# Patient Record
Sex: MALE | Race: BLACK OR AFRICAN AMERICAN | NOT HISPANIC OR LATINO
[De-identification: names, ages, dates, MRNs, and addresses within clinical notes are randomized per-mention and may not be internally consistent; named-entity substitution may affect disease eponyms.]

---

## 2017-02-13 ENCOUNTER — APPOINTMENT (OUTPATIENT)
Dept: INFECTIOUS DISEASE | Facility: CLINIC | Age: 32
End: 2017-02-13

## 2017-02-13 VITALS
SYSTOLIC BLOOD PRESSURE: 156 MMHG | HEIGHT: 68 IN | TEMPERATURE: 98.6 F | OXYGEN SATURATION: 98 % | WEIGHT: 160 LBS | BODY MASS INDEX: 24.25 KG/M2 | HEART RATE: 85 BPM | DIASTOLIC BLOOD PRESSURE: 89 MMHG

## 2017-02-13 RX ORDER — CLONIDINE HYDROCHLORIDE 0.1 MG/1
0.1 TABLET ORAL
Qty: 30 | Refills: 0 | Status: ACTIVE | COMMUNITY
Start: 2017-01-18

## 2017-02-13 RX ORDER — AMLODIPINE BESYLATE 10 MG/1
10 TABLET ORAL
Qty: 90 | Refills: 0 | Status: ACTIVE | COMMUNITY
Start: 2017-01-20

## 2017-02-16 LAB
CD3 CELLS # BLD: 1029 /UL
CD3 CELLS NFR BLD: 82 %
CD3+CD4+ CELLS # BLD: 418 /UL
CD3+CD4+ CELLS NFR BLD: 33 %
CD3+CD4+ CELLS/CD3+CD8+ CLL SPEC: 0.7 RATIO
CD3+CD8+ CELLS # SPEC: 593 /UL
CD3+CD8+ CELLS NFR BLD: 47 %
HIV1 RNA # SERPL NAA+PROBE: NOT DETECTED COPIES/ML
VIRAL LOAD LOG: NOT DETECTED LG10COP/ML

## 2017-03-21 ENCOUNTER — RX RENEWAL (OUTPATIENT)
Age: 32
End: 2017-03-21

## 2017-04-07 ENCOUNTER — OTHER (OUTPATIENT)
Age: 32
End: 2017-04-07

## 2017-04-18 ENCOUNTER — RX RENEWAL (OUTPATIENT)
Age: 32
End: 2017-04-18

## 2017-08-21 ENCOUNTER — RX RENEWAL (OUTPATIENT)
Age: 32
End: 2017-08-21

## 2017-11-02 ENCOUNTER — RX RENEWAL (OUTPATIENT)
Age: 32
End: 2017-11-02

## 2017-11-13 ENCOUNTER — RX RENEWAL (OUTPATIENT)
Age: 32
End: 2017-11-13

## 2017-11-16 ENCOUNTER — RX RENEWAL (OUTPATIENT)
Age: 32
End: 2017-11-16

## 2017-12-06 ENCOUNTER — APPOINTMENT (OUTPATIENT)
Dept: INFECTIOUS DISEASE | Facility: CLINIC | Age: 32
End: 2017-12-06

## 2018-01-05 ENCOUNTER — RX RENEWAL (OUTPATIENT)
Age: 33
End: 2018-01-05

## 2018-02-28 ENCOUNTER — RX RENEWAL (OUTPATIENT)
Age: 33
End: 2018-02-28

## 2018-03-06 ENCOUNTER — RX RENEWAL (OUTPATIENT)
Age: 33
End: 2018-03-06

## 2018-04-04 ENCOUNTER — APPOINTMENT (OUTPATIENT)
Dept: INFECTIOUS DISEASE | Facility: CLINIC | Age: 33
End: 2018-04-04

## 2018-04-26 ENCOUNTER — APPOINTMENT (OUTPATIENT)
Dept: INFECTIOUS DISEASE | Facility: CLINIC | Age: 33
End: 2018-04-26
Payer: MEDICARE

## 2018-04-26 VITALS
OXYGEN SATURATION: 90 % | WEIGHT: 152 LBS | HEIGHT: 68 IN | SYSTOLIC BLOOD PRESSURE: 186 MMHG | TEMPERATURE: 98.6 F | DIASTOLIC BLOOD PRESSURE: 114 MMHG | HEART RATE: 87 BPM | BODY MASS INDEX: 23.04 KG/M2

## 2018-04-26 PROCEDURE — 90732 PPSV23 VACC 2 YRS+ SUBQ/IM: CPT

## 2018-04-26 PROCEDURE — 90715 TDAP VACCINE 7 YRS/> IM: CPT

## 2018-04-26 PROCEDURE — 90472 IMMUNIZATION ADMIN EACH ADD: CPT

## 2018-04-26 PROCEDURE — G0009: CPT

## 2018-04-26 PROCEDURE — 99214 OFFICE O/P EST MOD 30 MIN: CPT | Mod: 25

## 2018-05-15 ENCOUNTER — RX RENEWAL (OUTPATIENT)
Age: 33
End: 2018-05-15

## 2018-08-30 ENCOUNTER — APPOINTMENT (OUTPATIENT)
Dept: INFECTIOUS DISEASE | Facility: CLINIC | Age: 33
End: 2018-08-30

## 2018-12-10 ENCOUNTER — APPOINTMENT (OUTPATIENT)
Dept: INFECTIOUS DISEASE | Facility: CLINIC | Age: 33
End: 2018-12-10
Payer: MEDICARE

## 2018-12-10 VITALS
HEART RATE: 87 BPM | HEIGHT: 68 IN | TEMPERATURE: 98.8 F | SYSTOLIC BLOOD PRESSURE: 158 MMHG | DIASTOLIC BLOOD PRESSURE: 96 MMHG | BODY MASS INDEX: 21.82 KG/M2 | OXYGEN SATURATION: 100 % | WEIGHT: 144 LBS

## 2018-12-10 PROCEDURE — 99213 OFFICE O/P EST LOW 20 MIN: CPT

## 2018-12-13 LAB
CD3 CELLS # BLD: 245 /UL
CD3 CELLS NFR BLD: 67 %
CD3+CD4+ CELLS # BLD: 135 /UL
CD3+CD4+ CELLS NFR BLD: 37 %
CD3+CD4+ CELLS/CD3+CD8+ CLL SPEC: 1.33 RATIO
CD3+CD8+ CELLS # SPEC: 101 /UL
CD3+CD8+ CELLS NFR BLD: 28 %
HIV1 RNA # SERPL NAA+PROBE: NORMAL
HIV1 RNA # SERPL NAA+PROBE: NORMAL COPIES/ML
VIRAL LOAD INTERP: NORMAL
VIRAL LOAD LOG: NORMAL LG COP/ML

## 2018-12-17 ENCOUNTER — RX RENEWAL (OUTPATIENT)
Age: 33
End: 2018-12-17

## 2019-02-08 ENCOUNTER — RX RENEWAL (OUTPATIENT)
Age: 34
End: 2019-02-08

## 2019-04-10 ENCOUNTER — APPOINTMENT (OUTPATIENT)
Dept: INFECTIOUS DISEASE | Facility: CLINIC | Age: 34
End: 2019-04-10
Payer: MEDICARE

## 2019-04-24 ENCOUNTER — RX RENEWAL (OUTPATIENT)
Age: 34
End: 2019-04-24

## 2019-05-24 ENCOUNTER — RX RENEWAL (OUTPATIENT)
Age: 34
End: 2019-05-24

## 2019-07-29 ENCOUNTER — RX RENEWAL (OUTPATIENT)
Age: 34
End: 2019-07-29

## 2019-08-16 ENCOUNTER — LABORATORY RESULT (OUTPATIENT)
Age: 34
End: 2019-08-16

## 2019-08-16 ENCOUNTER — APPOINTMENT (OUTPATIENT)
Dept: INFECTIOUS DISEASE | Facility: CLINIC | Age: 34
End: 2019-08-16
Payer: MEDICARE

## 2019-08-16 VITALS
DIASTOLIC BLOOD PRESSURE: 104 MMHG | OXYGEN SATURATION: 100 % | TEMPERATURE: 97.3 F | SYSTOLIC BLOOD PRESSURE: 170 MMHG | HEART RATE: 74 BPM | WEIGHT: 151 LBS | BODY MASS INDEX: 22.88 KG/M2 | HEIGHT: 68 IN

## 2019-08-16 DIAGNOSIS — I15.1 HYPERTENSION SECONDARY TO OTHER RENAL DISORDERS: ICD-10-CM

## 2019-08-16 DIAGNOSIS — Z01.00 ENCOUNTER FOR EXAMINATION OF EYES AND VISION W/OUT ABNORMAL FINDINGS: ICD-10-CM

## 2019-08-16 DIAGNOSIS — N28.89 HYPERTENSION SECONDARY TO OTHER RENAL DISORDERS: ICD-10-CM

## 2019-08-16 DIAGNOSIS — Z01.20 ENCOUNTER FOR DENTAL EXAMINATION AND CLEANING W/OUT ABNORMAL FINDINGS: ICD-10-CM

## 2019-08-16 DIAGNOSIS — Z92.89 PERSONAL HISTORY OF OTHER MEDICAL TREATMENT: ICD-10-CM

## 2019-08-16 PROCEDURE — 99214 OFFICE O/P EST MOD 30 MIN: CPT

## 2019-08-16 RX ORDER — SPIRONOLACTONE 25 MG/1
25 TABLET ORAL
Refills: 0 | Status: ACTIVE | COMMUNITY
Start: 2019-08-16

## 2019-08-16 NOTE — PHYSICAL EXAM
[General Appearance - Alert] : alert [General Appearance - In No Acute Distress] : in no acute distress [General Appearance - Well-Appearing] : healthy appearing [Sclera] : the sclera and conjunctiva were normal [PERRL With Normal Accommodation] : pupils were equal in size, round, reactive to light [Extraocular Movements] : extraocular movements were intact [Outer Ear] : the ears and nose were normal in appearance [Oropharynx] : the oropharynx was normal with no thrush [Neck Appearance] : the appearance of the neck was normal [Neck Cervical Mass (___cm)] : no neck mass was observed [Jugular Venous Distention Increased] : there was no jugular-venous distention [Thyroid Diffuse Enlargement] : the thyroid was not enlarged [Auscultation Breath Sounds / Voice Sounds] : lungs were clear to auscultation bilaterally [Heart Rate And Rhythm] : heart rate was normal and rhythm regular [Heart Sounds] : normal S1 and S2 [Murmurs] : no murmurs [Heart Sounds Gallop] : no gallops [Heart Sounds Pericardial Friction Rub] : no pericardial rub [Edema] : there was no peripheral edema [FreeTextEntry1] : UE AV fistula [Bowel Sounds] : normal bowel sounds [Abdomen Soft] : soft [Abdomen Tenderness] : non-tender [Abdomen Mass (___ Cm)] : no abdominal mass palpated [Costovertebral Angle Tenderness] : no CVA tenderness [No Palpable Adenopathy] : no palpable adenopathy [Skin Color & Pigmentation] : normal skin color and pigmentation [] : no rash [Oriented To Time, Place, And Person] : oriented to person, place, and time [Affect] : the affect was normal

## 2019-08-16 NOTE — ASSESSMENT
[Treatment Adherence] : treatment adherence [FreeTextEntry1] : HIV well controlled\par HTN being addressed\par ESRD on home HD - (listed for renal transplant at [facility])\par \par I am concerned about potential vascular complications of his HTN- currently being managed by his nephrologist\par \par Labs today [Medical Care Issues] : medical care issues

## 2019-08-16 NOTE — CONSULT LETTER
[Dear  ___] : Dear  [unfilled], [Consult Letter:] : I had the pleasure of evaluating your patient, [unfilled]. [Please see my note below.] : Please see my note below. [Consult Closing:] : Thank you very much for allowing me to participate in the care of this patient.  If you have any questions, please do not hesitate to contact me. [Sincerely,] : Sincerely, [FreeTextEntry2] : Erica Aragon MD\par [address] [facility]; [address]\[place]  [address] [FreeTextEntry3] : Gonsalo Joaquin MD, FACP ARMIN AAHIVM\par Infectious Diseases\par [facility] [DrNatalia  ___] : Dr. GIRARD

## 2019-08-16 NOTE — HISTORY OF PRESENT ILLNESS
[FreeTextEntry1] : Staying positive.\par Feeling well.\par Notes BP increased - recently begun on spironlactone by his nephrologist.\par He does home HD 5 times weekly without difficulty. Works in , exercises to some extent.Not sexually active at present.\par \par He takes Efavirenz, Lamivudine and weekly Tenofovir (each saturday) without adverse effects. On \par Labs from this visit 12/10/18 HIV vIral Load UNDETECTABLE, <12 jl; CD4 = 135 - 37%. He was feeling ill at that time\par \par As previously noted, Mr Lemus was hospitalized at [facility] between July 17 and August 6, 2015. The patient previously had a history of hypertension. The patient presented to [facility] medical history and had a complicated course complicated including group A streptococcal sepsis acute renal failure requiring hemodialysis respiratory failure pericarditis not requiring pericardiocentesis and pericardial window placement. \par Diagnostic evaluation revealed the patient to be HIV positive. \par On July 19, 2015 his HIV viral load was 297,059 and the HIV genotype was PANSUSCEPTIBLE. On July 20, 2015 his CD4 count was 199-20 percent. [Sexually Active] : The patient is not sexually active

## 2019-08-19 LAB
ALBUMIN SERPL ELPH-MCNC: 4.6 G/DL
ALP BLD-CCNC: 55 U/L
ALT SERPL-CCNC: 7 U/L
ANION GAP SERPL CALC-SCNC: 17 MMOL/L
AST SERPL-CCNC: <5 U/L
BASOPHILS # BLD AUTO: 0.03 K/UL
BASOPHILS NFR BLD AUTO: 0.9 %
BILIRUB SERPL-MCNC: 0.2 MG/DL
BUN SERPL-MCNC: 103 MG/DL
CALCIUM SERPL-MCNC: 8.7 MG/DL
CD3 CELLS # BLD: 597 /UL
CD3 CELLS NFR BLD: 84 %
CD3+CD4+ CELLS # BLD: 324 /UL
CD3+CD4+ CELLS NFR BLD: 46 %
CD3+CD4+ CELLS/CD3+CD8+ CLL SPEC: 1.27 RATIO
CD3+CD8+ CELLS # SPEC: 256 /UL
CD3+CD8+ CELLS NFR BLD: 36 %
CHLORIDE SERPL-SCNC: 101 MMOL/L
CHOLEST SERPL-MCNC: 231 MG/DL
CHOLEST/HDLC SERPL: 5.1 RATIO
CO2 SERPL-SCNC: 21 MMOL/L
CREAT SERPL-MCNC: 11.47 MG/DL
EOSINOPHIL # BLD AUTO: 0.05 K/UL
EOSINOPHIL NFR BLD AUTO: 1.5 %
GLUCOSE SERPL-MCNC: 88 MG/DL
HBV SURFACE AB SER QL: REACTIVE
HCT VFR BLD CALC: 27.2 %
HCV AB SER QL: NONREACTIVE
HCV S/CO RATIO: 0.09 S/CO
HDLC SERPL-MCNC: 45 MG/DL
HGB BLD-MCNC: 9.4 G/DL
HIV1 RNA # SERPL NAA+PROBE: NORMAL
HIV1 RNA # SERPL NAA+PROBE: NORMAL COPIES/ML
IMM GRANULOCYTES NFR BLD AUTO: 0.3 %
LDLC SERPL CALC-MCNC: 154 MG/DL
LYMPHOCYTES # BLD AUTO: 0.81 K/UL
LYMPHOCYTES NFR BLD AUTO: 23.7 %
MAN DIFF?: NORMAL
MCHC RBC-ENTMCNC: 32.2 PG
MCHC RBC-ENTMCNC: 34.6 GM/DL
MCV RBC AUTO: 93.2 FL
MONOCYTES # BLD AUTO: 0.39 K/UL
MONOCYTES NFR BLD AUTO: 11.4 %
NEUTROPHILS # BLD AUTO: 2.13 K/UL
NEUTROPHILS NFR BLD AUTO: 62.2 %
PLATELET # BLD AUTO: 211 K/UL
POTASSIUM SERPL-SCNC: 5.5 MMOL/L
PROT SERPL-MCNC: 7.4 G/DL
RBC # BLD: 2.92 M/UL
RBC # FLD: 14 %
SODIUM SERPL-SCNC: 139 MMOL/L
TRIGL SERPL-MCNC: 160 MG/DL
VIRAL LOAD INTERP: NORMAL
VIRAL LOAD LOG: NORMAL LG COP/ML
WBC # FLD AUTO: 3.42 K/UL

## 2019-08-20 ENCOUNTER — RX RENEWAL (OUTPATIENT)
Age: 34
End: 2019-08-20

## 2019-08-21 LAB — T PALLIDUM AB SER QL IA: POSITIVE

## 2019-08-22 LAB
M TB IFN-G BLD-IMP: NEGATIVE
QUANTIFERON TB PLUS MITOGEN MINUS NIL: 4.31 IU/ML
QUANTIFERON TB PLUS NIL: 0.01 IU/ML
QUANTIFERON TB PLUS TB1 MINUS NIL: 0 IU/ML
QUANTIFERON TB PLUS TB2 MINUS NIL: 0.01 IU/ML

## 2019-08-29 ENCOUNTER — CLINICAL ADVICE (OUTPATIENT)
Age: 34
End: 2019-08-29

## 2019-09-05 ENCOUNTER — APPOINTMENT (OUTPATIENT)
Dept: INFECTIOUS DISEASE | Facility: CLINIC | Age: 34
End: 2019-09-05

## 2019-11-15 ENCOUNTER — CLINICAL ADVICE (OUTPATIENT)
Age: 34
End: 2019-11-15

## 2019-12-04 ENCOUNTER — RX RENEWAL (OUTPATIENT)
Age: 34
End: 2019-12-04

## 2019-12-10 ENCOUNTER — APPOINTMENT (OUTPATIENT)
Dept: INFECTIOUS DISEASE | Facility: CLINIC | Age: 34
End: 2019-12-10

## 2020-01-01 ENCOUNTER — APPOINTMENT (OUTPATIENT)
Dept: INFECTIOUS DISEASE | Facility: CLINIC | Age: 35
End: 2020-01-01
Payer: MEDICARE

## 2020-01-01 ENCOUNTER — TRANSCRIPTION ENCOUNTER (OUTPATIENT)
Age: 35
End: 2020-01-01

## 2020-01-01 ENCOUNTER — RX RENEWAL (OUTPATIENT)
Age: 35
End: 2020-01-01

## 2020-01-01 ENCOUNTER — NON-APPOINTMENT (OUTPATIENT)
Age: 35
End: 2020-01-01

## 2020-01-01 ENCOUNTER — INPATIENT (INPATIENT)
Facility: HOSPITAL | Age: 35
LOS: 4 days | Discharge: HOME | End: 2020-03-02
Attending: INTERNAL MEDICINE | Admitting: INTERNAL MEDICINE
Payer: MEDICARE

## 2020-01-01 ENCOUNTER — OUTPATIENT (OUTPATIENT)
Dept: OUTPATIENT SERVICES | Facility: HOSPITAL | Age: 35
LOS: 1 days | End: 2020-01-01
Payer: MEDICARE

## 2020-01-01 ENCOUNTER — APPOINTMENT (OUTPATIENT)
Dept: INFECTIOUS DISEASE | Facility: CLINIC | Age: 35
End: 2020-01-01

## 2020-01-01 ENCOUNTER — MED ADMIN CHARGE (OUTPATIENT)
Age: 35
End: 2020-01-01

## 2020-01-01 VITALS
HEART RATE: 67 BPM | BODY MASS INDEX: 21.98 KG/M2 | HEIGHT: 68 IN | OXYGEN SATURATION: 100 % | DIASTOLIC BLOOD PRESSURE: 98 MMHG | SYSTOLIC BLOOD PRESSURE: 158 MMHG | WEIGHT: 145 LBS | TEMPERATURE: 97 F | RESPIRATION RATE: 18 BRPM

## 2020-01-01 VITALS
OXYGEN SATURATION: 95 % | HEART RATE: 96 BPM | TEMPERATURE: 100 F | SYSTOLIC BLOOD PRESSURE: 184 MMHG | DIASTOLIC BLOOD PRESSURE: 123 MMHG | RESPIRATION RATE: 18 BRPM

## 2020-01-01 VITALS
DIASTOLIC BLOOD PRESSURE: 84 MMHG | OXYGEN SATURATION: 99 % | TEMPERATURE: 98 F | SYSTOLIC BLOOD PRESSURE: 124 MMHG | HEART RATE: 80 BPM | RESPIRATION RATE: 18 BRPM | WEIGHT: 128.53 LBS

## 2020-01-01 DIAGNOSIS — N18.6 END STAGE RENAL DISEASE: ICD-10-CM

## 2020-01-01 DIAGNOSIS — I10 ESSENTIAL (PRIMARY) HYPERTENSION: ICD-10-CM

## 2020-01-01 DIAGNOSIS — Z29.9 ENCOUNTER FOR PROPHYLACTIC MEASURES, UNSPECIFIED: ICD-10-CM

## 2020-01-01 DIAGNOSIS — A53.9 SYPHILIS, UNSPECIFIED: ICD-10-CM

## 2020-01-01 DIAGNOSIS — D64.9 ANEMIA, UNSPECIFIED: ICD-10-CM

## 2020-01-01 DIAGNOSIS — R79.89 OTHER SPECIFIED ABNORMAL FINDINGS OF BLOOD CHEMISTRY: ICD-10-CM

## 2020-01-01 DIAGNOSIS — J18.9 PNEUMONIA, UNSPECIFIED ORGANISM: ICD-10-CM

## 2020-01-01 DIAGNOSIS — Z02.9 ENCOUNTER FOR ADMINISTRATIVE EXAMINATIONS, UNSPECIFIED: ICD-10-CM

## 2020-01-01 DIAGNOSIS — Z71.89 OTHER SPECIFIED COUNSELING: ICD-10-CM

## 2020-01-01 DIAGNOSIS — E87.5 HYPERKALEMIA: ICD-10-CM

## 2020-01-01 DIAGNOSIS — Z21 ASYMPTOMATIC HUMAN IMMUNODEFICIENCY VIRUS [HIV] INFECTION STATUS: ICD-10-CM

## 2020-01-01 DIAGNOSIS — B20 HUMAN IMMUNODEFICIENCY VIRUS [HIV] DISEASE: ICD-10-CM

## 2020-01-01 LAB
ALBUMIN SERPL ELPH-MCNC: 4.5 G/DL — SIGNIFICANT CHANGE UP (ref 3.3–5)
ALBUMIN SERPL ELPH-MCNC: 5.2 G/DL — HIGH (ref 3.3–5)
ALP SERPL-CCNC: 55 U/L — SIGNIFICANT CHANGE UP (ref 40–120)
ALP SERPL-CCNC: 62 U/L — SIGNIFICANT CHANGE UP (ref 40–120)
ALT FLD-CCNC: 19 U/L — SIGNIFICANT CHANGE UP (ref 4–41)
ALT FLD-CCNC: 22 U/L — SIGNIFICANT CHANGE UP (ref 4–41)
ANION GAP SERPL CALC-SCNC: 18 MMO/L — HIGH (ref 7–14)
ANION GAP SERPL CALC-SCNC: 21 MMO/L — HIGH (ref 7–14)
ANION GAP SERPL CALC-SCNC: 22 MMO/L — HIGH (ref 7–14)
ANION GAP SERPL CALC-SCNC: 28 MMO/L — HIGH (ref 7–14)
ANION GAP SERPL CALC-SCNC: 29 MMO/L — HIGH (ref 7–14)
ANION GAP SERPL CALC-SCNC: 29 MMO/L — HIGH (ref 7–14)
ANISOCYTOSIS BLD QL: SLIGHT — SIGNIFICANT CHANGE UP
APPEARANCE UR: CLEAR — SIGNIFICANT CHANGE UP
APTT BLD: 39.2 SEC — HIGH (ref 27.5–36.3)
AST SERPL-CCNC: 11 U/L — SIGNIFICANT CHANGE UP (ref 4–40)
AST SERPL-CCNC: 19 U/L — SIGNIFICANT CHANGE UP (ref 4–40)
B PERT DNA SPEC QL NAA+PROBE: NOT DETECTED — SIGNIFICANT CHANGE UP
BACTERIA # UR AUTO: NEGATIVE — SIGNIFICANT CHANGE UP
BACTERIA BLD CULT: SIGNIFICANT CHANGE UP
BACTERIA BLD CULT: SIGNIFICANT CHANGE UP
BACTERIA NPH CULT: SIGNIFICANT CHANGE UP
BACTERIA UR CULT: SIGNIFICANT CHANGE UP
BASE EXCESS BLDV CALC-SCNC: -6.2 MMOL/L — SIGNIFICANT CHANGE UP
BASE EXCESS BLDV CALC-SCNC: -7.5 MMOL/L — SIGNIFICANT CHANGE UP
BASOPHILS # BLD AUTO: 0.01 K/UL — SIGNIFICANT CHANGE UP (ref 0–0.2)
BASOPHILS # BLD AUTO: 0.01 K/UL — SIGNIFICANT CHANGE UP (ref 0–0.2)
BASOPHILS # BLD AUTO: 0.03 K/UL — SIGNIFICANT CHANGE UP (ref 0–0.2)
BASOPHILS NFR BLD AUTO: 0.3 % — SIGNIFICANT CHANGE UP (ref 0–2)
BASOPHILS NFR BLD AUTO: 0.3 % — SIGNIFICANT CHANGE UP (ref 0–2)
BASOPHILS NFR BLD AUTO: 0.5 % — SIGNIFICANT CHANGE UP (ref 0–2)
BASOPHILS NFR BLD AUTO: 1 % — SIGNIFICANT CHANGE UP (ref 0–2)
BASOPHILS NFR BLD AUTO: 1 % — SIGNIFICANT CHANGE UP (ref 0–2)
BASOPHILS NFR SPEC: 0 % — SIGNIFICANT CHANGE UP (ref 0–2)
BILIRUB SERPL-MCNC: 0.2 MG/DL — SIGNIFICANT CHANGE UP (ref 0.2–1.2)
BILIRUB SERPL-MCNC: 0.3 MG/DL — SIGNIFICANT CHANGE UP (ref 0.2–1.2)
BILIRUB UR-MCNC: NEGATIVE — SIGNIFICANT CHANGE UP
BLASTS # FLD: 0 % — SIGNIFICANT CHANGE UP (ref 0–0)
BLD GP AB SCN SERPL QL: NEGATIVE — SIGNIFICANT CHANGE UP
BLD GP AB SCN SERPL QL: NEGATIVE — SIGNIFICANT CHANGE UP
BLOOD GAS VENOUS - CREATININE: 12.7 MG/DL — HIGH (ref 0.5–1.3)
BLOOD GAS VENOUS - CREATININE: 13.6 MG/DL — HIGH (ref 0.5–1.3)
BLOOD GAS VENOUS - FIO2: 21 — SIGNIFICANT CHANGE UP
BLOOD UR QL VISUAL: NEGATIVE — SIGNIFICANT CHANGE UP
BUN SERPL-MCNC: 102 MG/DL — HIGH (ref 7–23)
BUN SERPL-MCNC: 113 MG/DL — HIGH (ref 7–23)
BUN SERPL-MCNC: 118 MG/DL — HIGH (ref 7–23)
BUN SERPL-MCNC: 32 MG/DL — HIGH (ref 7–23)
BUN SERPL-MCNC: 56 MG/DL — HIGH (ref 7–23)
BUN SERPL-MCNC: 59 MG/DL — HIGH (ref 7–23)
BUN SERPL-MCNC: 75 MG/DL — HIGH (ref 7–23)
BUN SERPL-MCNC: 75 MG/DL — HIGH (ref 7–23)
C PNEUM DNA SPEC QL NAA+PROBE: NOT DETECTED — SIGNIFICANT CHANGE UP
CALCIUM SERPL-MCNC: 8.6 MG/DL — SIGNIFICANT CHANGE UP (ref 8.4–10.5)
CALCIUM SERPL-MCNC: 8.6 MG/DL — SIGNIFICANT CHANGE UP (ref 8.4–10.5)
CALCIUM SERPL-MCNC: 9 MG/DL — SIGNIFICANT CHANGE UP (ref 8.4–10.5)
CALCIUM SERPL-MCNC: 9 MG/DL — SIGNIFICANT CHANGE UP (ref 8.4–10.5)
CALCIUM SERPL-MCNC: 9.3 MG/DL — SIGNIFICANT CHANGE UP (ref 8.4–10.5)
CALCIUM SERPL-MCNC: 9.4 MG/DL — SIGNIFICANT CHANGE UP (ref 8.4–10.5)
CALCIUM SERPL-MCNC: 9.5 MG/DL — SIGNIFICANT CHANGE UP (ref 8.4–10.5)
CALCIUM SERPL-MCNC: 9.6 MG/DL — SIGNIFICANT CHANGE UP (ref 8.4–10.5)
CD3 CELLS # BLD: 779 /UL
CD3 CELLS NFR BLD: 76 %
CD3+CD4+ CELLS # BLD: 411 /UL
CD3+CD4+ CELLS NFR BLD: 40 %
CD3+CD4+ CELLS/CD3+CD8+ CLL SPEC: 1.19 RATIO
CD3+CD8+ CELLS # SPEC: 346 /UL
CD3+CD8+ CELLS NFR BLD: 34 %
CHLORIDE BLDV-SCNC: 96 MMOL/L — SIGNIFICANT CHANGE UP (ref 96–108)
CHLORIDE BLDV-SCNC: 98 MMOL/L — SIGNIFICANT CHANGE UP (ref 96–108)
CHLORIDE SERPL-SCNC: 88 MMOL/L — LOW (ref 98–107)
CHLORIDE SERPL-SCNC: 89 MMOL/L — LOW (ref 98–107)
CHLORIDE SERPL-SCNC: 90 MMOL/L — LOW (ref 98–107)
CHLORIDE SERPL-SCNC: 91 MMOL/L — LOW (ref 98–107)
CHLORIDE SERPL-SCNC: 92 MMOL/L — LOW (ref 98–107)
CHOLEST SERPL-MCNC: 233 MG/DL — HIGH (ref 120–199)
CO2 SERPL-SCNC: 15 MMOL/L — LOW (ref 22–31)
CO2 SERPL-SCNC: 16 MMOL/L — LOW (ref 22–31)
CO2 SERPL-SCNC: 17 MMOL/L — LOW (ref 22–31)
CO2 SERPL-SCNC: 20 MMOL/L — LOW (ref 22–31)
CO2 SERPL-SCNC: 22 MMOL/L — SIGNIFICANT CHANGE UP (ref 22–31)
CO2 SERPL-SCNC: 22 MMOL/L — SIGNIFICANT CHANGE UP (ref 22–31)
CO2 SERPL-SCNC: 23 MMOL/L — SIGNIFICANT CHANGE UP (ref 22–31)
CO2 SERPL-SCNC: 24 MMOL/L — SIGNIFICANT CHANGE UP (ref 22–31)
COLOR SPEC: SIGNIFICANT CHANGE UP
CREAT SERPL-MCNC: 11.76 MG/DL — HIGH (ref 0.5–1.3)
CREAT SERPL-MCNC: 12.67 MG/DL — HIGH (ref 0.5–1.3)
CREAT SERPL-MCNC: 12.75 MG/DL — HIGH (ref 0.5–1.3)
CREAT SERPL-MCNC: 5.77 MG/DL — HIGH (ref 0.5–1.3)
CREAT SERPL-MCNC: 8.32 MG/DL — HIGH (ref 0.5–1.3)
CREAT SERPL-MCNC: 9.09 MG/DL — HIGH (ref 0.5–1.3)
CREAT SERPL-MCNC: 9.09 MG/DL — HIGH (ref 0.5–1.3)
CREAT SERPL-MCNC: 9.5 MG/DL — HIGH (ref 0.5–1.3)
EOSINOPHIL # BLD AUTO: 0 K/UL — SIGNIFICANT CHANGE UP (ref 0–0.5)
EOSINOPHIL # BLD AUTO: 0 K/UL — SIGNIFICANT CHANGE UP (ref 0–0.5)
EOSINOPHIL # BLD AUTO: 0.01 K/UL — SIGNIFICANT CHANGE UP (ref 0–0.5)
EOSINOPHIL # BLD AUTO: 0.03 K/UL — SIGNIFICANT CHANGE UP (ref 0–0.5)
EOSINOPHIL # BLD AUTO: 0.04 K/UL — SIGNIFICANT CHANGE UP (ref 0–0.5)
EOSINOPHIL NFR BLD AUTO: 0 % — SIGNIFICANT CHANGE UP (ref 0–6)
EOSINOPHIL NFR BLD AUTO: 0 % — SIGNIFICANT CHANGE UP (ref 0–6)
EOSINOPHIL NFR BLD AUTO: 0.3 % — SIGNIFICANT CHANGE UP (ref 0–6)
EOSINOPHIL NFR BLD AUTO: 1 % — SIGNIFICANT CHANGE UP (ref 0–6)
EOSINOPHIL NFR BLD AUTO: 1.4 % — SIGNIFICANT CHANGE UP (ref 0–6)
EOSINOPHIL NFR FLD: 0.9 % — SIGNIFICANT CHANGE UP (ref 0–6)
FERRITIN SERPL-MCNC: 1567 NG/ML — HIGH (ref 30–400)
FERRITIN SERPL-MCNC: 2600 NG/ML — HIGH (ref 30–400)
FLUAV H1 2009 PAND RNA SPEC QL NAA+PROBE: NOT DETECTED — SIGNIFICANT CHANGE UP
FLUAV H1 RNA SPEC QL NAA+PROBE: NOT DETECTED — SIGNIFICANT CHANGE UP
FLUAV H3 RNA SPEC QL NAA+PROBE: NOT DETECTED — SIGNIFICANT CHANGE UP
FLUAV SUBTYP SPEC NAA+PROBE: NOT DETECTED — SIGNIFICANT CHANGE UP
FLUBV RNA SPEC QL NAA+PROBE: DETECTED — HIGH
FOLATE SERPL-MCNC: > 20 NG/ML — HIGH (ref 4.7–20)
GAS PNL BLDV: 137 MMOL/L — SIGNIFICANT CHANGE UP (ref 136–146)
GAS PNL BLDV: 138 MMOL/L — SIGNIFICANT CHANGE UP (ref 136–146)
GIANT PLATELETS BLD QL SMEAR: PRESENT — SIGNIFICANT CHANGE UP
GLUCOSE BLDV-MCNC: 93 MG/DL — SIGNIFICANT CHANGE UP (ref 70–99)
GLUCOSE BLDV-MCNC: 98 MG/DL — SIGNIFICANT CHANGE UP (ref 70–99)
GLUCOSE SERPL-MCNC: 100 MG/DL — HIGH (ref 70–99)
GLUCOSE SERPL-MCNC: 84 MG/DL — SIGNIFICANT CHANGE UP (ref 70–99)
GLUCOSE SERPL-MCNC: 86 MG/DL — SIGNIFICANT CHANGE UP (ref 70–99)
GLUCOSE SERPL-MCNC: 86 MG/DL — SIGNIFICANT CHANGE UP (ref 70–99)
GLUCOSE SERPL-MCNC: 90 MG/DL — SIGNIFICANT CHANGE UP (ref 70–99)
GLUCOSE SERPL-MCNC: 90 MG/DL — SIGNIFICANT CHANGE UP (ref 70–99)
GLUCOSE SERPL-MCNC: 93 MG/DL — SIGNIFICANT CHANGE UP (ref 70–99)
GLUCOSE SERPL-MCNC: 96 MG/DL — SIGNIFICANT CHANGE UP (ref 70–99)
GLUCOSE UR-MCNC: NEGATIVE — SIGNIFICANT CHANGE UP
HADV DNA SPEC QL NAA+PROBE: NOT DETECTED — SIGNIFICANT CHANGE UP
HBA1C BLD-MCNC: 4.8 % — SIGNIFICANT CHANGE UP (ref 4–5.6)
HBV SURFACE AB SER-ACNC: >1000 MLU/ML — SIGNIFICANT CHANGE UP
HBV SURFACE AG SER-ACNC: NEGATIVE — SIGNIFICANT CHANGE UP
HCO3 BLDV-SCNC: 17 MMOL/L — LOW (ref 20–27)
HCO3 BLDV-SCNC: 18 MMOL/L — LOW (ref 20–27)
HCOV PNL SPEC NAA+PROBE: SIGNIFICANT CHANGE UP
HCT VFR BLD CALC: 23.4 % — LOW (ref 39–50)
HCT VFR BLD CALC: 24.1 % — LOW (ref 39–50)
HCT VFR BLD CALC: 24.1 % — LOW (ref 39–50)
HCT VFR BLD CALC: 27 % — LOW (ref 39–50)
HCT VFR BLD CALC: 30.9 % — LOW (ref 39–50)
HCT VFR BLD CALC: 30.9 % — LOW (ref 39–50)
HCT VFR BLD CALC: 31.5 % — LOW (ref 39–50)
HCT VFR BLD CALC: 31.7 % — LOW (ref 39–50)
HCT VFR BLDV CALC: 25.6 % — LOW (ref 39–51)
HCT VFR BLDV CALC: 27.7 % — LOW (ref 39–51)
HDLC SERPL-MCNC: 41 MG/DL — SIGNIFICANT CHANGE UP (ref 35–55)
HGB BLD-MCNC: 10.3 G/DL — LOW (ref 13–17)
HGB BLD-MCNC: 10.3 G/DL — LOW (ref 13–17)
HGB BLD-MCNC: 10.4 G/DL — LOW (ref 13–17)
HGB BLD-MCNC: 10.7 G/DL — LOW (ref 13–17)
HGB BLD-MCNC: 7.5 G/DL — LOW (ref 13–17)
HGB BLD-MCNC: 7.9 G/DL — LOW (ref 13–17)
HGB BLD-MCNC: 7.9 G/DL — LOW (ref 13–17)
HGB BLD-MCNC: 8.7 G/DL — LOW (ref 13–17)
HGB BLDV-MCNC: 8.2 G/DL — LOW (ref 13–17)
HGB BLDV-MCNC: 8.9 G/DL — LOW (ref 13–17)
HIV COMBO RESULT: HIGH
HIV-1 VIRAL LOAD RESULT: SIGNIFICANT CHANGE UP
HIV1 RNA # SERPL NAA+PROBE: NORMAL
HIV1 RNA # SERPL NAA+PROBE: NORMAL COPIES/ML
HIV1 RNA # SERPL NAA+PROBE: SIGNIFICANT CHANGE UP
HIV1 RNA SER-IMP: SIGNIFICANT CHANGE UP
HIV1 RNA SERPL NAA+PROBE-ACNC: SIGNIFICANT CHANGE UP
HIV1 RNA SERPL NAA+PROBE-LOG#: SIGNIFICANT CHANGE UP
HIV1+2 AB SPEC QL: HIGH
HIV1+2 AB SPEC QL: SIGNIFICANT CHANGE UP
HIV1+2 AB SPEC QL: SIGNIFICANT CHANGE UP
HMPV RNA SPEC QL NAA+PROBE: NOT DETECTED — SIGNIFICANT CHANGE UP
HPIV1 RNA SPEC QL NAA+PROBE: NOT DETECTED — SIGNIFICANT CHANGE UP
HPIV2 RNA SPEC QL NAA+PROBE: NOT DETECTED — SIGNIFICANT CHANGE UP
HPIV3 RNA SPEC QL NAA+PROBE: NOT DETECTED — SIGNIFICANT CHANGE UP
HPIV4 RNA SPEC QL NAA+PROBE: NOT DETECTED — SIGNIFICANT CHANGE UP
HYALINE CASTS # UR AUTO: NEGATIVE — SIGNIFICANT CHANGE UP
IMM GRANULOCYTES NFR BLD AUTO: 0 % — SIGNIFICANT CHANGE UP (ref 0–1.5)
IMM GRANULOCYTES NFR BLD AUTO: 0.3 % — SIGNIFICANT CHANGE UP (ref 0–1.5)
IMM GRANULOCYTES NFR BLD AUTO: 0.5 % — SIGNIFICANT CHANGE UP (ref 0–1.5)
INR BLD: 1.16 — SIGNIFICANT CHANGE UP (ref 0.88–1.17)
IRON SATN MFR SERPL: 21 UG/DL — LOW (ref 45–165)
IRON SATN MFR SERPL: 212 UG/DL — SIGNIFICANT CHANGE UP (ref 155–535)
IRON SATN MFR SERPL: 213 UG/DL — SIGNIFICANT CHANGE UP (ref 155–535)
IRON SATN MFR SERPL: 68 UG/DL — SIGNIFICANT CHANGE UP (ref 45–165)
KETONES UR-MCNC: NEGATIVE — SIGNIFICANT CHANGE UP
LACTATE BLDV-MCNC: 1.4 MMOL/L — SIGNIFICANT CHANGE UP (ref 0.5–2)
LACTATE BLDV-MCNC: 2.4 MMOL/L — HIGH (ref 0.5–2)
LACTATE SERPL-SCNC: 1.4 MMOL/L — SIGNIFICANT CHANGE UP (ref 0.5–2)
LEUKOCYTE ESTERASE UR-ACNC: NEGATIVE — SIGNIFICANT CHANGE UP
LIPID PNL WITH DIRECT LDL SERPL: 151 MG/DL — SIGNIFICANT CHANGE UP
LYMPHOCYTES # BLD AUTO: 0.57 K/UL — LOW (ref 1–3.3)
LYMPHOCYTES # BLD AUTO: 0.91 K/UL — LOW (ref 1–3.3)
LYMPHOCYTES # BLD AUTO: 0.92 K/UL — LOW (ref 1–3.3)
LYMPHOCYTES # BLD AUTO: 1.4 K/UL — SIGNIFICANT CHANGE UP (ref 1–3.3)
LYMPHOCYTES # BLD AUTO: 1.41 K/UL — SIGNIFICANT CHANGE UP (ref 1–3.3)
LYMPHOCYTES # BLD AUTO: 30.3 % — SIGNIFICANT CHANGE UP (ref 13–44)
LYMPHOCYTES # BLD AUTO: 31.6 % — SIGNIFICANT CHANGE UP (ref 13–44)
LYMPHOCYTES # BLD AUTO: 45.2 % — HIGH (ref 13–44)
LYMPHOCYTES # BLD AUTO: 49.3 % — HIGH (ref 13–44)
LYMPHOCYTES # BLD AUTO: 9.2 % — LOW (ref 13–44)
LYMPHOCYTES NFR SPEC AUTO: 47.3 % — HIGH (ref 13–44)
MACROCYTES BLD QL: SLIGHT — SIGNIFICANT CHANGE UP
MAGNESIUM SERPL-MCNC: 2.2 MG/DL — SIGNIFICANT CHANGE UP (ref 1.6–2.6)
MAGNESIUM SERPL-MCNC: 2.4 MG/DL — SIGNIFICANT CHANGE UP (ref 1.6–2.6)
MCHC RBC-ENTMCNC: 30.9 PG — SIGNIFICANT CHANGE UP (ref 27–34)
MCHC RBC-ENTMCNC: 31.5 PG — SIGNIFICANT CHANGE UP (ref 27–34)
MCHC RBC-ENTMCNC: 31.6 PG — SIGNIFICANT CHANGE UP (ref 27–34)
MCHC RBC-ENTMCNC: 31.7 PG — SIGNIFICANT CHANGE UP (ref 27–34)
MCHC RBC-ENTMCNC: 32.1 % — SIGNIFICANT CHANGE UP (ref 32–36)
MCHC RBC-ENTMCNC: 32.1 PG — SIGNIFICANT CHANGE UP (ref 27–34)
MCHC RBC-ENTMCNC: 32.2 % — SIGNIFICANT CHANGE UP (ref 32–36)
MCHC RBC-ENTMCNC: 32.8 % — SIGNIFICANT CHANGE UP (ref 32–36)
MCHC RBC-ENTMCNC: 33.3 % — SIGNIFICANT CHANGE UP (ref 32–36)
MCHC RBC-ENTMCNC: 33.3 % — SIGNIFICANT CHANGE UP (ref 32–36)
MCHC RBC-ENTMCNC: 34 % — SIGNIFICANT CHANGE UP (ref 32–36)
MCV RBC AUTO: 100 FL — SIGNIFICANT CHANGE UP (ref 80–100)
MCV RBC AUTO: 93.2 FL — SIGNIFICANT CHANGE UP (ref 80–100)
MCV RBC AUTO: 94.1 FL — SIGNIFICANT CHANGE UP (ref 80–100)
MCV RBC AUTO: 94.5 FL — SIGNIFICANT CHANGE UP (ref 80–100)
MCV RBC AUTO: 94.5 FL — SIGNIFICANT CHANGE UP (ref 80–100)
MCV RBC AUTO: 96 FL — SIGNIFICANT CHANGE UP (ref 80–100)
MCV RBC AUTO: 96 FL — SIGNIFICANT CHANGE UP (ref 80–100)
MCV RBC AUTO: 98.2 FL — SIGNIFICANT CHANGE UP (ref 80–100)
METAMYELOCYTES # FLD: 0 % — SIGNIFICANT CHANGE UP (ref 0–1)
MONOCYTES # BLD AUTO: 0.31 K/UL — SIGNIFICANT CHANGE UP (ref 0–0.9)
MONOCYTES # BLD AUTO: 0.38 K/UL — SIGNIFICANT CHANGE UP (ref 0–0.9)
MONOCYTES # BLD AUTO: 0.49 K/UL — SIGNIFICANT CHANGE UP (ref 0–0.9)
MONOCYTES # BLD AUTO: 0.5 K/UL — SIGNIFICANT CHANGE UP (ref 0–0.9)
MONOCYTES # BLD AUTO: 0.51 K/UL — SIGNIFICANT CHANGE UP (ref 0–0.9)
MONOCYTES NFR BLD AUTO: 10.2 % — SIGNIFICANT CHANGE UP (ref 2–14)
MONOCYTES NFR BLD AUTO: 16.1 % — HIGH (ref 2–14)
MONOCYTES NFR BLD AUTO: 17 % — HIGH (ref 2–14)
MONOCYTES NFR BLD AUTO: 17.8 % — HIGH (ref 2–14)
MONOCYTES NFR BLD AUTO: 6.2 % — SIGNIFICANT CHANGE UP (ref 2–14)
MONOCYTES NFR BLD: 8.2 % — SIGNIFICANT CHANGE UP (ref 2–9)
MUCOUS THREADS # UR AUTO: SIGNIFICANT CHANGE UP
MYELOCYTES NFR BLD: 0 % — SIGNIFICANT CHANGE UP (ref 0–0)
NEUTROPHIL AB SER-ACNC: 39.1 % — LOW (ref 43–77)
NEUTROPHILS # BLD AUTO: 0.89 K/UL — LOW (ref 1.8–7.4)
NEUTROPHILS # BLD AUTO: 1.13 K/UL — LOW (ref 1.8–7.4)
NEUTROPHILS # BLD AUTO: 1.45 K/UL — LOW (ref 1.8–7.4)
NEUTROPHILS # BLD AUTO: 1.77 K/UL — LOW (ref 1.8–7.4)
NEUTROPHILS # BLD AUTO: 5.16 K/UL — SIGNIFICANT CHANGE UP (ref 1.8–7.4)
NEUTROPHILS NFR BLD AUTO: 31.2 % — LOW (ref 43–77)
NEUTROPHILS NFR BLD AUTO: 36.4 % — LOW (ref 43–77)
NEUTROPHILS NFR BLD AUTO: 50.5 % — SIGNIFICANT CHANGE UP (ref 43–77)
NEUTROPHILS NFR BLD AUTO: 58.2 % — SIGNIFICANT CHANGE UP (ref 43–77)
NEUTROPHILS NFR BLD AUTO: 83.6 % — HIGH (ref 43–77)
NEUTS BAND # BLD: 0 % — SIGNIFICANT CHANGE UP (ref 0–6)
NITRITE UR-MCNC: NEGATIVE — SIGNIFICANT CHANGE UP
NRBC # FLD: 0 K/UL — SIGNIFICANT CHANGE UP (ref 0–0)
OTHER - HEMATOLOGY %: 0 — SIGNIFICANT CHANGE UP
OVALOCYTES BLD QL SMEAR: SLIGHT — SIGNIFICANT CHANGE UP
PCO2 BLDV: 39 MMHG — LOW (ref 41–51)
PCO2 BLDV: 42 MMHG — SIGNIFICANT CHANGE UP (ref 41–51)
PH BLDV: 7.29 PH — LOW (ref 7.32–7.43)
PH BLDV: 7.29 PH — LOW (ref 7.32–7.43)
PH UR: 6.5 — SIGNIFICANT CHANGE UP (ref 5–8)
PHOSPHATE SERPL-MCNC: 12.1 MG/DL — HIGH (ref 2.5–4.5)
PHOSPHATE SERPL-MCNC: 4.8 MG/DL — HIGH (ref 2.5–4.5)
PHOSPHATE SERPL-MCNC: 4.9 MG/DL — HIGH (ref 2.5–4.5)
PHOSPHATE SERPL-MCNC: 7.8 MG/DL — HIGH (ref 2.5–4.5)
PHOSPHATE SERPL-MCNC: 8.2 MG/DL — HIGH (ref 2.5–4.5)
PHOSPHATE SERPL-MCNC: 9 MG/DL — HIGH (ref 2.5–4.5)
PLATELET # BLD AUTO: 152 K/UL — SIGNIFICANT CHANGE UP (ref 150–400)
PLATELET # BLD AUTO: 175 K/UL — SIGNIFICANT CHANGE UP (ref 150–400)
PLATELET # BLD AUTO: 178 K/UL — SIGNIFICANT CHANGE UP (ref 150–400)
PLATELET # BLD AUTO: 178 K/UL — SIGNIFICANT CHANGE UP (ref 150–400)
PLATELET # BLD AUTO: 198 K/UL — SIGNIFICANT CHANGE UP (ref 150–400)
PLATELET # BLD AUTO: 198 K/UL — SIGNIFICANT CHANGE UP (ref 150–400)
PLATELET # BLD AUTO: 210 K/UL — SIGNIFICANT CHANGE UP (ref 150–400)
PLATELET # BLD AUTO: 230 K/UL — SIGNIFICANT CHANGE UP (ref 150–400)
PLATELET COUNT - ESTIMATE: NORMAL — SIGNIFICANT CHANGE UP
PMV BLD: 10.1 FL — SIGNIFICANT CHANGE UP (ref 7–13)
PMV BLD: 10.2 FL — SIGNIFICANT CHANGE UP (ref 7–13)
PMV BLD: 10.2 FL — SIGNIFICANT CHANGE UP (ref 7–13)
PMV BLD: 10.3 FL — SIGNIFICANT CHANGE UP (ref 7–13)
PMV BLD: 10.3 FL — SIGNIFICANT CHANGE UP (ref 7–13)
PMV BLD: 10.5 FL — SIGNIFICANT CHANGE UP (ref 7–13)
PMV BLD: 10.5 FL — SIGNIFICANT CHANGE UP (ref 7–13)
PMV BLD: 10.8 FL — SIGNIFICANT CHANGE UP (ref 7–13)
PO2 BLDV: 25 MMHG — LOW (ref 35–40)
PO2 BLDV: < 24 MMHG — LOW (ref 35–40)
POLYCHROMASIA BLD QL SMEAR: SLIGHT — SIGNIFICANT CHANGE UP
POTASSIUM BLDV-SCNC: 5.7 MMOL/L — HIGH (ref 3.4–4.5)
POTASSIUM BLDV-SCNC: 6 MMOL/L — HIGH (ref 3.4–4.5)
POTASSIUM SERPL-MCNC: 3.5 MMOL/L — SIGNIFICANT CHANGE UP (ref 3.5–5.3)
POTASSIUM SERPL-MCNC: 4.2 MMOL/L — SIGNIFICANT CHANGE UP (ref 3.5–5.3)
POTASSIUM SERPL-MCNC: 4.2 MMOL/L — SIGNIFICANT CHANGE UP (ref 3.5–5.3)
POTASSIUM SERPL-MCNC: 4.4 MMOL/L — SIGNIFICANT CHANGE UP (ref 3.5–5.3)
POTASSIUM SERPL-MCNC: 4.4 MMOL/L — SIGNIFICANT CHANGE UP (ref 3.5–5.3)
POTASSIUM SERPL-MCNC: 5.8 MMOL/L — HIGH (ref 3.5–5.3)
POTASSIUM SERPL-MCNC: 6 MMOL/L — HIGH (ref 3.5–5.3)
POTASSIUM SERPL-MCNC: 6.2 MMOL/L — CRITICAL HIGH (ref 3.5–5.3)
POTASSIUM SERPL-SCNC: 3.5 MMOL/L — SIGNIFICANT CHANGE UP (ref 3.5–5.3)
POTASSIUM SERPL-SCNC: 4.2 MMOL/L — SIGNIFICANT CHANGE UP (ref 3.5–5.3)
POTASSIUM SERPL-SCNC: 4.2 MMOL/L — SIGNIFICANT CHANGE UP (ref 3.5–5.3)
POTASSIUM SERPL-SCNC: 4.4 MMOL/L — SIGNIFICANT CHANGE UP (ref 3.5–5.3)
POTASSIUM SERPL-SCNC: 4.4 MMOL/L — SIGNIFICANT CHANGE UP (ref 3.5–5.3)
POTASSIUM SERPL-SCNC: 5.8 MMOL/L — HIGH (ref 3.5–5.3)
POTASSIUM SERPL-SCNC: 6 MMOL/L — HIGH (ref 3.5–5.3)
POTASSIUM SERPL-SCNC: 6.2 MMOL/L — CRITICAL HIGH (ref 3.5–5.3)
PROCALCITONIN SERPL-MCNC: 2.95 NG/ML — HIGH (ref 0.02–0.1)
PROMYELOCYTES # FLD: 0 % — SIGNIFICANT CHANGE UP (ref 0–0)
PROT SERPL-MCNC: 7.6 G/DL — SIGNIFICANT CHANGE UP (ref 6–8.3)
PROT SERPL-MCNC: 8.7 G/DL — HIGH (ref 6–8.3)
PROT UR-MCNC: 300 — HIGH
PROTHROM AB SERPL-ACNC: 13.3 SEC — HIGH (ref 9.8–13.1)
PTH-INTACT SERPL-MCNC: 363 PG/ML — HIGH (ref 15–65)
RBC # BLD: 2.34 M/UL — LOW (ref 4.2–5.8)
RBC # BLD: 2.51 M/UL — LOW (ref 4.2–5.8)
RBC # BLD: 2.51 M/UL — LOW (ref 4.2–5.8)
RBC # BLD: 2.75 M/UL — LOW (ref 4.2–5.8)
RBC # BLD: 3.27 M/UL — LOW (ref 4.2–5.8)
RBC # BLD: 3.27 M/UL — LOW (ref 4.2–5.8)
RBC # BLD: 3.37 M/UL — LOW (ref 4.2–5.8)
RBC # BLD: 3.38 M/UL — LOW (ref 4.2–5.8)
RBC # FLD: 13.8 % — SIGNIFICANT CHANGE UP (ref 10.3–14.5)
RBC # FLD: 14.3 % — SIGNIFICANT CHANGE UP (ref 10.3–14.5)
RBC # FLD: 14.3 % — SIGNIFICANT CHANGE UP (ref 10.3–14.5)
RBC # FLD: 14.5 % — SIGNIFICANT CHANGE UP (ref 10.3–14.5)
RBC # FLD: 14.8 % — HIGH (ref 10.3–14.5)
RBC # FLD: 14.9 % — HIGH (ref 10.3–14.5)
RBC CASTS # UR COMP ASSIST: SIGNIFICANT CHANGE UP (ref 0–?)
RH IG SCN BLD-IMP: POSITIVE — SIGNIFICANT CHANGE UP
RH IG SCN BLD-IMP: POSITIVE — SIGNIFICANT CHANGE UP
RPR SER-TITR: ABNORMAL
RSV RNA SPEC QL NAA+PROBE: NOT DETECTED — SIGNIFICANT CHANGE UP
RV+EV RNA SPEC QL NAA+PROBE: NOT DETECTED — SIGNIFICANT CHANGE UP
SAO2 % BLDV: 21.2 % — LOW (ref 60–85)
SAO2 % BLDV: 30.3 % — LOW (ref 60–85)
SODIUM SERPL-SCNC: 132 MMOL/L — LOW (ref 135–145)
SODIUM SERPL-SCNC: 133 MMOL/L — LOW (ref 135–145)
SODIUM SERPL-SCNC: 134 MMOL/L — LOW (ref 135–145)
SODIUM SERPL-SCNC: 138 MMOL/L — SIGNIFICANT CHANGE UP (ref 135–145)
SP GR SPEC: 1.01 — SIGNIFICANT CHANGE UP (ref 1–1.04)
SPECIMEN SOURCE: SIGNIFICANT CHANGE UP
SQUAMOUS # UR AUTO: SIGNIFICANT CHANGE UP
TRIGL SERPL-MCNC: 235 MG/DL — HIGH (ref 10–149)
TSH SERPL-MCNC: 0.68 UIU/ML — SIGNIFICANT CHANGE UP (ref 0.27–4.2)
UIBC SERPL-MCNC: 143.9 UG/DL — SIGNIFICANT CHANGE UP (ref 110–370)
UIBC SERPL-MCNC: 191.8 UG/DL — SIGNIFICANT CHANGE UP (ref 110–370)
UROBILINOGEN FLD QL: NORMAL — SIGNIFICANT CHANGE UP
VARIANT LYMPHS # BLD: 4.5 % — SIGNIFICANT CHANGE UP
VIRAL LOAD INTERP: NORMAL
VIRAL LOAD LOG: NORMAL LG COP/ML
VIT B12 SERPL-MCNC: 963 PG/ML — HIGH (ref 200–900)
WBC # BLD: 2.86 K/UL — LOW (ref 3.8–10.5)
WBC # BLD: 2.88 K/UL — LOW (ref 3.8–10.5)
WBC # BLD: 2.88 K/UL — LOW (ref 3.8–10.5)
WBC # BLD: 3.04 K/UL — LOW (ref 3.8–10.5)
WBC # BLD: 3.04 K/UL — LOW (ref 3.8–10.5)
WBC # BLD: 3.1 K/UL — LOW (ref 3.8–10.5)
WBC # BLD: 3.93 K/UL — SIGNIFICANT CHANGE UP (ref 3.8–10.5)
WBC # BLD: 6.17 K/UL — SIGNIFICANT CHANGE UP (ref 3.8–10.5)
WBC # FLD AUTO: 2.86 K/UL — LOW (ref 3.8–10.5)
WBC # FLD AUTO: 2.88 K/UL — LOW (ref 3.8–10.5)
WBC # FLD AUTO: 2.88 K/UL — LOW (ref 3.8–10.5)
WBC # FLD AUTO: 3.04 K/UL — LOW (ref 3.8–10.5)
WBC # FLD AUTO: 3.04 K/UL — LOW (ref 3.8–10.5)
WBC # FLD AUTO: 3.1 K/UL — LOW (ref 3.8–10.5)
WBC # FLD AUTO: 3.93 K/UL — SIGNIFICANT CHANGE UP (ref 3.8–10.5)
WBC # FLD AUTO: 6.17 K/UL — SIGNIFICANT CHANGE UP (ref 3.8–10.5)
WBC UR QL: SIGNIFICANT CHANGE UP (ref 0–?)

## 2020-01-01 PROCEDURE — 99214 OFFICE O/P EST MOD 30 MIN: CPT

## 2020-01-01 PROCEDURE — 99223 1ST HOSP IP/OBS HIGH 75: CPT | Mod: GC

## 2020-01-01 PROCEDURE — 74174 CTA ABD&PLVS W/CONTRAST: CPT | Mod: 26

## 2020-01-01 PROCEDURE — 96372 THER/PROPH/DIAG INJ SC/IM: CPT

## 2020-01-01 PROCEDURE — 99238 HOSP IP/OBS DSCHRG MGMT 30/<: CPT

## 2020-01-01 PROCEDURE — G9001: CPT

## 2020-01-01 PROCEDURE — 71046 X-RAY EXAM CHEST 2 VIEWS: CPT | Mod: 26

## 2020-01-01 PROCEDURE — 99232 SBSQ HOSP IP/OBS MODERATE 35: CPT

## 2020-01-01 PROCEDURE — 71250 CT THORAX DX C-: CPT | Mod: 26

## 2020-01-01 PROCEDURE — 93306 TTE W/DOPPLER COMPLETE: CPT | Mod: 26

## 2020-01-01 RX ORDER — HEPARIN SODIUM 5000 [USP'U]/ML
5000 INJECTION INTRAVENOUS; SUBCUTANEOUS EVERY 8 HOURS
Refills: 0 | Status: DISCONTINUED | OUTPATIENT
Start: 2020-01-01 | End: 2020-01-01

## 2020-01-01 RX ORDER — FUROSEMIDE 40 MG
80 TABLET ORAL ONCE
Refills: 0 | Status: COMPLETED | OUTPATIENT
Start: 2020-01-01 | End: 2020-01-01

## 2020-01-01 RX ORDER — LAMIVUDINE 150 MG
15 TABLET ORAL
Qty: 0 | Refills: 0 | DISCHARGE
Start: 2020-01-01

## 2020-01-01 RX ORDER — OMEGA-3 ACID ETHYL ESTERS 1 G
2 CAPSULE ORAL
Qty: 0 | Refills: 0 | DISCHARGE

## 2020-01-01 RX ORDER — CLONIDINE HYDROCHLORIDE 0.3 MG/1
0.3 TABLET ORAL 3 TIMES DAILY
Refills: 0 | Status: ACTIVE | COMMUNITY
Start: 2020-01-01

## 2020-01-01 RX ORDER — DEXTROSE 50 % IN WATER 50 %
50 SYRINGE (ML) INTRAVENOUS ONCE
Refills: 0 | Status: COMPLETED | OUTPATIENT
Start: 2020-01-01 | End: 2020-01-01

## 2020-01-01 RX ORDER — AZITHROMYCIN 500 MG/1
500 TABLET, FILM COATED ORAL EVERY 24 HOURS
Refills: 0 | Status: DISCONTINUED | OUTPATIENT
Start: 2020-01-01 | End: 2020-01-01

## 2020-01-01 RX ORDER — ZOLPIDEM TARTRATE 10 MG/1
5 TABLET ORAL ONCE
Refills: 0 | Status: DISCONTINUED | OUTPATIENT
Start: 2020-01-01 | End: 2020-01-01

## 2020-01-01 RX ORDER — PENICILLIN G BENZATHINE 2400000 [IU]/4ML
2400000 INJECTION, SUSPENSION INTRAMUSCULAR
Qty: 1 | Refills: 0 | Status: COMPLETED | OUTPATIENT
Start: 2020-01-01

## 2020-01-01 RX ORDER — LABETALOL HCL 100 MG
600 TABLET ORAL THREE TIMES A DAY
Refills: 0 | Status: DISCONTINUED | OUTPATIENT
Start: 2020-01-01 | End: 2020-01-01

## 2020-01-01 RX ORDER — EFAVIRENZ 600 MG/1
1 TABLET, FILM COATED ORAL
Qty: 0 | Refills: 0 | DISCHARGE
Start: 2020-01-01

## 2020-01-01 RX ORDER — LAMIVUDINE 150 MG
75 TABLET ORAL DAILY
Refills: 0 | Status: DISCONTINUED | OUTPATIENT
Start: 2020-01-01 | End: 2020-01-01

## 2020-01-01 RX ORDER — LABETALOL HCL 100 MG
3 TABLET ORAL
Qty: 0 | Refills: 0 | DISCHARGE

## 2020-01-01 RX ORDER — LABETALOL HCL 100 MG
200 TABLET ORAL ONCE
Refills: 0 | Status: COMPLETED | OUTPATIENT
Start: 2020-01-01 | End: 2020-01-01

## 2020-01-01 RX ORDER — FUROSEMIDE 80 MG/1
80 TABLET ORAL DAILY
Refills: 0 | Status: ACTIVE | COMMUNITY
Start: 2020-01-01

## 2020-01-01 RX ORDER — SEVELAMER CARBONATE 2400 MG/1
2400 POWDER, FOR SUSPENSION ORAL
Refills: 0 | Status: DISCONTINUED | OUTPATIENT
Start: 2020-01-01 | End: 2020-01-01

## 2020-01-01 RX ORDER — SEVELAMER CARBONATE 2400 MG/1
3 POWDER, FOR SUSPENSION ORAL
Qty: 0 | Refills: 0 | DISCHARGE
Start: 2020-01-01

## 2020-01-01 RX ORDER — INSULIN HUMAN 100 [IU]/ML
5 INJECTION, SOLUTION SUBCUTANEOUS ONCE
Refills: 0 | Status: COMPLETED | OUTPATIENT
Start: 2020-01-01 | End: 2020-01-01

## 2020-01-01 RX ORDER — CHLORHEXIDINE GLUCONATE 213 G/1000ML
1 SOLUTION TOPICAL DAILY
Refills: 0 | Status: DISCONTINUED | OUTPATIENT
Start: 2020-01-01 | End: 2020-01-01

## 2020-01-01 RX ORDER — FUROSEMIDE 40 MG
1 TABLET ORAL
Qty: 0 | Refills: 0 | DISCHARGE

## 2020-01-01 RX ORDER — SPIRONOLACTONE 25 MG/1
100 TABLET, FILM COATED ORAL EVERY 12 HOURS
Refills: 0 | Status: COMPLETED | OUTPATIENT
Start: 2020-01-01 | End: 2020-01-01

## 2020-01-01 RX ORDER — LABETALOL HCL 100 MG
600 TABLET ORAL
Refills: 0 | Status: DISCONTINUED | OUTPATIENT
Start: 2020-01-01 | End: 2020-01-01

## 2020-01-01 RX ORDER — SODIUM ZIRCONIUM CYCLOSILICATE 10 G/10G
5 POWDER, FOR SUSPENSION ORAL ONCE
Refills: 0 | Status: COMPLETED | OUTPATIENT
Start: 2020-01-01 | End: 2020-01-01

## 2020-01-01 RX ORDER — EFAVIRENZ 600 MG/1
600 TABLET, FILM COATED ORAL EVERY 24 HOURS
Refills: 0 | Status: DISCONTINUED | OUTPATIENT
Start: 2020-01-01 | End: 2020-01-01

## 2020-01-01 RX ORDER — SODIUM CHLORIDE 9 MG/ML
1000 INJECTION INTRAMUSCULAR; INTRAVENOUS; SUBCUTANEOUS ONCE
Refills: 0 | Status: COMPLETED | OUTPATIENT
Start: 2020-01-01 | End: 2020-01-01

## 2020-01-01 RX ORDER — SPIRONOLACTONE 25 MG/1
1 TABLET, FILM COATED ORAL
Qty: 0 | Refills: 0 | DISCHARGE

## 2020-01-01 RX ORDER — SEVELAMER CARBONATE 2400 MG/1
800 POWDER, FOR SUSPENSION ORAL THREE TIMES A DAY
Refills: 0 | Status: DISCONTINUED | OUTPATIENT
Start: 2020-01-01 | End: 2020-01-01

## 2020-01-01 RX ORDER — LOSARTAN POTASSIUM 100 MG/1
1 TABLET, FILM COATED ORAL
Qty: 0 | Refills: 0 | DISCHARGE

## 2020-01-01 RX ORDER — CEFTRIAXONE 500 MG/1
1000 INJECTION, POWDER, FOR SOLUTION INTRAMUSCULAR; INTRAVENOUS ONCE
Refills: 0 | Status: COMPLETED | OUTPATIENT
Start: 2020-01-01 | End: 2020-01-01

## 2020-01-01 RX ORDER — TENOFOVIR DISOPROXIL FUMARATE 300 MG/1
1 TABLET, FILM COATED ORAL
Qty: 0 | Refills: 0 | DISCHARGE
Start: 2020-01-01

## 2020-01-01 RX ORDER — TENOFOVIR DISOPROXIL FUMARATE 300 MG/1
300 TABLET, FILM COATED ORAL
Refills: 0 | Status: DISCONTINUED | OUTPATIENT
Start: 2020-01-01 | End: 2020-01-01

## 2020-01-01 RX ORDER — LOSARTAN POTASSIUM 100 MG/1
100 TABLET, FILM COATED ORAL AT BEDTIME
Refills: 0 | Status: DISCONTINUED | OUTPATIENT
Start: 2020-01-01 | End: 2020-01-01

## 2020-01-01 RX ORDER — AMLODIPINE BESYLATE 2.5 MG/1
10 TABLET ORAL DAILY
Refills: 0 | Status: DISCONTINUED | OUTPATIENT
Start: 2020-01-01 | End: 2020-01-01

## 2020-01-01 RX ORDER — PENICILLIN G BENZATHINE 2400000 [IU]/4ML
2400000 INJECTION, SUSPENSION INTRAMUSCULAR
Qty: 1 | Refills: 0 | Status: DISCONTINUED | OUTPATIENT
Start: 2019-08-21 | End: 2020-01-01

## 2020-01-01 RX ORDER — AMLODIPINE BESYLATE 2.5 MG/1
1 TABLET ORAL
Qty: 0 | Refills: 0 | DISCHARGE

## 2020-01-01 RX ORDER — CALCIUM GLUCONATE 100 MG/ML
1 VIAL (ML) INTRAVENOUS ONCE
Refills: 0 | Status: COMPLETED | OUTPATIENT
Start: 2020-01-01 | End: 2020-01-01

## 2020-01-01 RX ORDER — HYDRALAZINE HCL 50 MG
100 TABLET ORAL THREE TIMES A DAY
Refills: 0 | Status: DISCONTINUED | OUTPATIENT
Start: 2020-01-01 | End: 2020-01-01

## 2020-01-01 RX ORDER — HYDRALAZINE HCL 50 MG
1 TABLET ORAL
Qty: 0 | Refills: 0 | DISCHARGE

## 2020-01-01 RX ORDER — HYDRALAZINE HCL 50 MG
100 TABLET ORAL ONCE
Refills: 0 | Status: COMPLETED | OUTPATIENT
Start: 2020-01-01 | End: 2020-01-01

## 2020-01-01 RX ADMIN — AZITHROMYCIN 255 MILLIGRAM(S): 500 TABLET, FILM COATED ORAL at 16:45

## 2020-01-01 RX ADMIN — Medication 100 MILLIGRAM(S): at 09:31

## 2020-01-01 RX ADMIN — CHLORHEXIDINE GLUCONATE 1 APPLICATION(S): 213 SOLUTION TOPICAL at 08:40

## 2020-01-01 RX ADMIN — LOSARTAN POTASSIUM 100 MILLIGRAM(S): 100 TABLET, FILM COATED ORAL at 22:18

## 2020-01-01 RX ADMIN — LOSARTAN POTASSIUM 100 MILLIGRAM(S): 100 TABLET, FILM COATED ORAL at 21:43

## 2020-01-01 RX ADMIN — SEVELAMER CARBONATE 2400 MILLIGRAM(S): 2400 POWDER, FOR SUSPENSION ORAL at 12:23

## 2020-01-01 RX ADMIN — Medication 0.3 MILLIGRAM(S): at 12:39

## 2020-01-01 RX ADMIN — Medication 0.3 MILLIGRAM(S): at 00:00

## 2020-01-01 RX ADMIN — Medication 200 MILLIGRAM(S): at 16:40

## 2020-01-01 RX ADMIN — SODIUM CHLORIDE 1000 MILLILITER(S): 9 INJECTION INTRAMUSCULAR; INTRAVENOUS; SUBCUTANEOUS at 14:00

## 2020-01-01 RX ADMIN — Medication 600 MILLIGRAM(S): at 11:58

## 2020-01-01 RX ADMIN — SEVELAMER CARBONATE 2400 MILLIGRAM(S): 2400 POWDER, FOR SUSPENSION ORAL at 11:53

## 2020-01-01 RX ADMIN — CHLORHEXIDINE GLUCONATE 1 APPLICATION(S): 213 SOLUTION TOPICAL at 12:22

## 2020-01-01 RX ADMIN — Medication 600 MILLIGRAM(S): at 01:48

## 2020-01-01 RX ADMIN — Medication 0.3 MILLIGRAM(S): at 22:18

## 2020-01-01 RX ADMIN — Medication 100 MILLIGRAM(S): at 01:48

## 2020-01-01 RX ADMIN — SEVELAMER CARBONATE 2400 MILLIGRAM(S): 2400 POWDER, FOR SUSPENSION ORAL at 17:08

## 2020-01-01 RX ADMIN — Medication 100 MILLIGRAM(S): at 17:19

## 2020-01-01 RX ADMIN — Medication 100 MILLIGRAM(S): at 09:09

## 2020-01-01 RX ADMIN — SPIRONOLACTONE 100 MILLIGRAM(S): 25 TABLET, FILM COATED ORAL at 10:48

## 2020-01-01 RX ADMIN — LOSARTAN POTASSIUM 100 MILLIGRAM(S): 100 TABLET, FILM COATED ORAL at 01:49

## 2020-01-01 RX ADMIN — Medication 0.3 MILLIGRAM(S): at 17:23

## 2020-01-01 RX ADMIN — Medication 100 MILLIGRAM(S): at 08:40

## 2020-01-01 RX ADMIN — AMLODIPINE BESYLATE 10 MILLIGRAM(S): 2.5 TABLET ORAL at 09:40

## 2020-01-01 RX ADMIN — Medication 75 MILLIGRAM(S): at 04:59

## 2020-01-01 RX ADMIN — EFAVIRENZ 600 MILLIGRAM(S): 600 TABLET, FILM COATED ORAL at 08:40

## 2020-01-01 RX ADMIN — Medication 100 GRAM(S): at 23:41

## 2020-01-01 RX ADMIN — Medication 0.3 MILLIGRAM(S): at 09:09

## 2020-01-01 RX ADMIN — Medication 30 MILLIGRAM(S): at 10:48

## 2020-01-01 RX ADMIN — SPIRONOLACTONE 100 MILLIGRAM(S): 25 TABLET, FILM COATED ORAL at 06:42

## 2020-01-01 RX ADMIN — LOSARTAN POTASSIUM 100 MILLIGRAM(S): 100 TABLET, FILM COATED ORAL at 22:14

## 2020-01-01 RX ADMIN — Medication 100 MILLIGRAM(S): at 12:39

## 2020-01-01 RX ADMIN — LOSARTAN POTASSIUM 100 MILLIGRAM(S): 100 TABLET, FILM COATED ORAL at 21:28

## 2020-01-01 RX ADMIN — Medication 0.2 MILLIGRAM(S): at 16:39

## 2020-01-01 RX ADMIN — EFAVIRENZ 600 MILLIGRAM(S): 600 TABLET, FILM COATED ORAL at 09:31

## 2020-01-01 RX ADMIN — Medication 600 MILLIGRAM(S): at 21:28

## 2020-01-01 RX ADMIN — Medication 75 MILLIGRAM(S): at 11:54

## 2020-01-01 RX ADMIN — Medication 30 MILLIGRAM(S): at 02:05

## 2020-01-01 RX ADMIN — SPIRONOLACTONE 100 MILLIGRAM(S): 25 TABLET, FILM COATED ORAL at 17:19

## 2020-01-01 RX ADMIN — Medication 600 MILLIGRAM(S): at 18:36

## 2020-01-01 RX ADMIN — Medication 80 MILLIGRAM(S): at 22:13

## 2020-01-01 RX ADMIN — Medication 75 MILLIGRAM(S): at 12:23

## 2020-01-01 RX ADMIN — Medication 50 MILLILITER(S): at 02:33

## 2020-01-01 RX ADMIN — SEVELAMER CARBONATE 2400 MILLIGRAM(S): 2400 POWDER, FOR SUSPENSION ORAL at 12:31

## 2020-01-01 RX ADMIN — SEVELAMER CARBONATE 2400 MILLIGRAM(S): 2400 POWDER, FOR SUSPENSION ORAL at 17:11

## 2020-01-01 RX ADMIN — SEVELAMER CARBONATE 2400 MILLIGRAM(S): 2400 POWDER, FOR SUSPENSION ORAL at 08:50

## 2020-01-01 RX ADMIN — SPIRONOLACTONE 100 MILLIGRAM(S): 25 TABLET, FILM COATED ORAL at 18:37

## 2020-01-01 RX ADMIN — SEVELAMER CARBONATE 800 MILLIGRAM(S): 2400 POWDER, FOR SUSPENSION ORAL at 22:18

## 2020-01-01 RX ADMIN — SEVELAMER CARBONATE 800 MILLIGRAM(S): 2400 POWDER, FOR SUSPENSION ORAL at 09:40

## 2020-01-01 RX ADMIN — SPIRONOLACTONE 100 MILLIGRAM(S): 25 TABLET, FILM COATED ORAL at 23:47

## 2020-01-01 RX ADMIN — INSULIN HUMAN 5 UNIT(S): 100 INJECTION, SOLUTION SUBCUTANEOUS at 02:34

## 2020-01-01 RX ADMIN — Medication 1 TABLET(S): at 08:40

## 2020-01-01 RX ADMIN — Medication 600 MILLIGRAM(S): at 17:12

## 2020-01-01 RX ADMIN — Medication 0.3 MILLIGRAM(S): at 01:48

## 2020-01-01 RX ADMIN — Medication 1 TABLET(S): at 12:23

## 2020-01-01 RX ADMIN — SPIRONOLACTONE 100 MILLIGRAM(S): 25 TABLET, FILM COATED ORAL at 12:30

## 2020-01-01 RX ADMIN — Medication 1 TABLET(S): at 12:30

## 2020-01-01 RX ADMIN — CHLORHEXIDINE GLUCONATE 1 APPLICATION(S): 213 SOLUTION TOPICAL at 12:30

## 2020-01-01 RX ADMIN — Medication 30 MILLIGRAM(S): at 18:36

## 2020-01-01 RX ADMIN — Medication 600 MILLIGRAM(S): at 09:31

## 2020-01-01 RX ADMIN — EFAVIRENZ 600 MILLIGRAM(S): 600 TABLET, FILM COATED ORAL at 21:28

## 2020-01-01 RX ADMIN — AMLODIPINE BESYLATE 10 MILLIGRAM(S): 2.5 TABLET ORAL at 06:42

## 2020-01-01 RX ADMIN — SPIRONOLACTONE 100 MILLIGRAM(S): 25 TABLET, FILM COATED ORAL at 12:23

## 2020-01-01 RX ADMIN — Medication 100 MILLIGRAM(S): at 16:40

## 2020-01-01 RX ADMIN — Medication 0.3 MILLIGRAM(S): at 22:14

## 2020-01-01 RX ADMIN — Medication 600 MILLIGRAM(S): at 17:41

## 2020-01-01 RX ADMIN — SPIRONOLACTONE 100 MILLIGRAM(S): 25 TABLET, FILM COATED ORAL at 20:40

## 2020-01-01 RX ADMIN — CHLORHEXIDINE GLUCONATE 1 APPLICATION(S): 213 SOLUTION TOPICAL at 09:32

## 2020-01-01 RX ADMIN — Medication 0.3 MILLIGRAM(S): at 08:40

## 2020-01-01 RX ADMIN — Medication 100 MILLIGRAM(S): at 22:15

## 2020-01-01 RX ADMIN — Medication 100 MILLIGRAM(S): at 15:52

## 2020-01-01 RX ADMIN — Medication 1 TABLET(S): at 11:58

## 2020-01-01 RX ADMIN — HEPARIN SODIUM 5000 UNIT(S): 5000 INJECTION INTRAVENOUS; SUBCUTANEOUS at 21:28

## 2020-01-01 RX ADMIN — SPIRONOLACTONE 100 MILLIGRAM(S): 25 TABLET, FILM COATED ORAL at 01:49

## 2020-01-01 RX ADMIN — SEVELAMER CARBONATE 2400 MILLIGRAM(S): 2400 POWDER, FOR SUSPENSION ORAL at 08:40

## 2020-01-01 RX ADMIN — Medication 100 MILLIGRAM(S): at 22:18

## 2020-01-01 RX ADMIN — Medication 600 MILLIGRAM(S): at 09:08

## 2020-01-01 RX ADMIN — Medication 1 TABLET(S): at 09:40

## 2020-01-01 RX ADMIN — Medication 600 MILLIGRAM(S): at 00:27

## 2020-01-01 RX ADMIN — TENOFOVIR DISOPROXIL FUMARATE 300 MILLIGRAM(S): 300 TABLET, FILM COATED ORAL at 17:41

## 2020-01-01 RX ADMIN — SODIUM CHLORIDE 1000 MILLILITER(S): 9 INJECTION INTRAMUSCULAR; INTRAVENOUS; SUBCUTANEOUS at 16:40

## 2020-01-01 RX ADMIN — Medication 0.3 MILLIGRAM(S): at 09:40

## 2020-01-01 RX ADMIN — SEVELAMER CARBONATE 800 MILLIGRAM(S): 2400 POWDER, FOR SUSPENSION ORAL at 15:52

## 2020-01-01 RX ADMIN — EFAVIRENZ 600 MILLIGRAM(S): 600 TABLET, FILM COATED ORAL at 01:48

## 2020-01-01 RX ADMIN — Medication 0.3 MILLIGRAM(S): at 15:52

## 2020-01-01 RX ADMIN — Medication 30 MILLILITER(S): at 16:46

## 2020-01-01 RX ADMIN — SODIUM ZIRCONIUM CYCLOSILICATE 5 GRAM(S): 10 POWDER, FOR SUSPENSION ORAL at 03:14

## 2020-01-01 RX ADMIN — Medication 600 MILLIGRAM(S): at 08:39

## 2020-01-01 RX ADMIN — Medication 600 MILLIGRAM(S): at 12:39

## 2020-01-01 RX ADMIN — CHLORHEXIDINE GLUCONATE 1 APPLICATION(S): 213 SOLUTION TOPICAL at 22:16

## 2020-01-01 RX ADMIN — AMLODIPINE BESYLATE 10 MILLIGRAM(S): 2.5 TABLET ORAL at 05:43

## 2020-01-01 RX ADMIN — Medication 75 MILLIGRAM(S): at 12:34

## 2020-01-01 RX ADMIN — Medication 600 MILLIGRAM(S): at 00:00

## 2020-01-01 RX ADMIN — CEFTRIAXONE 100 MILLIGRAM(S): 500 INJECTION, POWDER, FOR SOLUTION INTRAMUSCULAR; INTRAVENOUS at 16:00

## 2020-01-01 RX ADMIN — Medication 100 GRAM(S): at 02:32

## 2020-01-01 RX ADMIN — Medication 100 MILLIGRAM(S): at 10:18

## 2020-01-01 RX ADMIN — SEVELAMER CARBONATE 2400 MILLIGRAM(S): 2400 POWDER, FOR SUSPENSION ORAL at 17:23

## 2020-01-01 RX ADMIN — Medication 100 MILLIGRAM(S): at 00:00

## 2020-01-01 RX ADMIN — SODIUM ZIRCONIUM CYCLOSILICATE 5 GRAM(S): 10 POWDER, FOR SUSPENSION ORAL at 23:41

## 2020-01-01 RX ADMIN — Medication 600 MILLIGRAM(S): at 22:18

## 2020-01-01 RX ADMIN — SEVELAMER CARBONATE 2400 MILLIGRAM(S): 2400 POWDER, FOR SUSPENSION ORAL at 09:08

## 2020-02-26 NOTE — H&P ADULT - NSHPPHYSICALEXAM_GEN_ALL_CORE
T(C): 37 (02-26-20 @ 21:00), Max: 37.6 (02-26-20 @ 12:24)  HR: 89 (02-26-20 @ 21:00) (89 - 97)  BP: 172/128 (02-26-20 @ 21:00) (162/113 - 197/127)  RR: 18 (02-26-20 @ 21:00) (18 - 19)  SpO2: 100% (02-26-20 @ 21:00) (89% - 100%)    Constitutional: NAD, well-developed, well-nourished  Ears, Nose, Mouth, and Throat: normal external ears and nose, normal hearing, moist oral mucosa  Eyes: normal conjunctiva, EOMI, PERRL  Neck: supple, no JVD  Respiratory: Clear to auscultation bilaterally. No wheezes, rales or rhonchi. Normal respiratory effort  Cardiovascular: RRR, no M/R/G, no edema, 2+ Peripheral Pulses  Gastrointestinal: soft, nontender, nondistended, +BS, no hernia  Skin: warm, dry, no rash  Neurologic: sensation grossly intact, CN grossly intact, non-focal exam  Musculoskeletal: no clubbing, no cyanosis, no joint swelling  Psychiatric: AOX3, appropriate mood, affect

## 2020-02-26 NOTE — ED ADULT TRIAGE NOTE - CHIEF COMPLAINT QUOTE
Pt is a dialysis patient with left AV fistula. Pt states does HOME dialysis , last time was this past monday. Pt states was told to come in because his Hemoglobin was low  7.4 and needs transfusion. Pt co fatigue with sob. Pt with abdominal cramping and gas

## 2020-02-26 NOTE — H&P ADULT - PROBLEM SELECTOR PLAN 7
DVT ppx: sub q hep q8hrs, oob - Continue Home BP meds w/ parameters   - monitor BP   - DASH/TLC diet

## 2020-02-26 NOTE — H&P ADULT - PROBLEM SELECTOR PLAN 4
- h/h 8.7/27  - pt was told hbg was 7.3 outpatient   - Iron studies w/ b12 and folate AM  - will hold off transfusion at the moment  will check morning cbc - K 5.8   - Will get HD Am  - s/p lokelma   - s/p Calcium gluconate   - will repeat BMP  - monitor - lactate 2.4  - s/p 1L NS bolus by ER  - will repeat Lactate  - monitor

## 2020-02-26 NOTE — ED ADULT NURSE NOTE - OBJECTIVE STATEMENT
Pt A+OX3.  Sent by MD for low Hg of 7.3.  Pt c/o SOB when going up stairs, feeling very tired, and a lot of gas x2 days.  Denies CP.  Says he has a dialysis machine in his home and he dialyzes himself.  L AV fistula.  Appears comfortable.  Labs obtained and sent as ordered.  #20g SL R arm placed.  Pt to XR.

## 2020-02-26 NOTE — ED PROVIDER NOTE - ATTENDING CONTRIBUTION TO CARE
Attending Statement: I have reviewed and agree with all pertinent clinical information, including history and physical exam and agree with treatment plan of the PA, except as noted.  34ypM hx of ESRD on HD, hx HIV+, sent in for anemia. Feeling fatigue, cough and sob x couple days. +productive cough. no fever or chills no chest pain. +abdominal bloating. no nausea or vomit. no travel no diarrhea no palpitations no melena Sent in by PMD  Vital signs noted. nontoxic appearing male. no resp distress thin nt abdomen moves all ext  plan labs, sepsis workup, cxr, ekg, plan admit, HD tonight  amos w pt nephrologist.   pt aware of plan to admit.

## 2020-02-26 NOTE — H&P ADULT - PROBLEM SELECTOR PLAN 2
- Continue Home BP meds w/ parameters   - monitor BP   - DASH/TLC diet - Monitor BMP daily w/ mg and phos   - Monitor Cr  - avoid nephrotoxic agents   - 2/27 AM HD   - Renal following  - In the ER, pt desatted to 83% on RA , pt placed on 3L NC satting 100%. Spoke with   Pt will go to HD 5am, for now will give 80IVP Lasix and restrict fluids.

## 2020-02-26 NOTE — H&P ADULT - PROBLEM SELECTOR PLAN 8
1.  Name of PCP:   2.  PCP Contacted on Admission: [ ] Y    [x] N - admitted at night    3.  PCP contacted at Discharge: [ ] Y    [ ] N    [ ] N/A  4.  Post-Discharge Appointment Date and Location:  5.  Summary of Handoff given to PCP: DVT ppx: sub q hep q8hrs, oob

## 2020-02-26 NOTE — ED ADULT NURSE REASSESSMENT NOTE - NS ED NURSE REASSESS COMMENT FT1
pt received alert and oriented x3. respirations equal and unlabored. pt declines sob or cp at the moment. pt noted to be hypertensive. Tele PA jeffrey made aware. nsr on cm. Call bell in reach, warm blanket provided, bed in lowest position, side rails up x2,safety maintained. will continue to monitor.

## 2020-02-26 NOTE — H&P ADULT - PROBLEM SELECTOR PLAN 6
1.  Name of PCP:   2.  PCP Contacted on Admission: [ ] Y    [x] N - admitted at night    3.  PCP contacted at Discharge: [ ] Y    [ ] N    [ ] N/A  4.  Post-Discharge Appointment Date and Location:  5.  Summary of Handoff given to PCP: - Continue Home BP meds w/ parameters   - monitor BP   - DASH/TLC diet - HIV +   - Follow Dr. Hernesto Martinez (ID)  - Call Dr. Martinez AM for c/s   - monitor - HIV +   - pt takes medications of Sustiva and lamivudine. unable to recall dosage   - Day team to follow up ID and ordered meds   - Follow Dr. Hernesto Martinez (ID)  - Call Dr. Martinez AM for c/s   - monitor

## 2020-02-26 NOTE — H&P ADULT - PROBLEM SELECTOR PLAN 9
1.  Name of PCP:   2.  PCP Contacted on Admission: [ ] Y    [x] N - admitted at night    3.  PCP contacted at Discharge: [ ] Y    [ ] N    [ ] N/A  4.  Post-Discharge Appointment Date and Location:  5.  Summary of Handoff given to PCP:

## 2020-02-26 NOTE — ED PROVIDER NOTE - OBJECTIVE STATEMENT
33yo M HIV+, ESRD on home HD w/ left AV fistula, last time was this past Monday. Pt co fatigue with sob. 33yo M HIV+, ESRD on home HD w/ left AV fistula, last time was this past Monday presents to the told he should receive PRBC transfusion, pt's Nephrologist Dr. Castro advised hgb checked outpatient was 7.4, pt presents to the ED c/o fatigue, cough, sob and abd bloating x few days. Pt denies cp, lightheadedness, dizziness, palpitations, abd pain, n/v/d, dark stools, BRBPR.

## 2020-02-26 NOTE — H&P ADULT - ASSESSMENT
32 y/o Male with PMHx of HTN, ESRD on home hemodialysis, secondary hyperparathyroidism presents with fatigue, Malaise, loose stools , abdominal bloating , dry cough and mild SOB since Monday. Pt is being admitted for PNA and symptomatic anemia. 32 y/o Male with PMHx of HIV+,HTN, ESRD on home hemodialysis, secondary hyperparathyroidism presents with fatigue, Malaise, loose stools , abdominal bloating , dry cough and mild SOB since Monday. Pt is being admitted for PNA and symptomatic anemia.

## 2020-02-26 NOTE — H&P ADULT - HISTORY OF PRESENT ILLNESS
32 y/o Male with PMHx of HTN, ESRD on home hemodialysis, secondary hyperparathyroidism presents with fatigue, Malaise, loose stools , abdominal bloating , dry cough and mild SOB since Monday. Pt was seen by Dr. Castro (Renal/PCP). Pt's lab results showed hbg of 7.3 was sent to ER for possible blood transfusion. Pt also endorse lack of appetite and low energy , unable to sleep past few days. Abdominal pain with gas pain, that believed that pain was due to gas and used Gas X with minimal relief. Pt denies chest pain, PND,, palpitations, diaphoresis, lightheadedness, dizziness, syncope, increased lowr extremity edema, fever chills,, myalgias, anorexia, weight changes ( loss or gain), nightsweats, generalized fatigue abdominal pain, constipation , BRBPR, melena, urinary symptoms, and wheezing. Pt admits that roommate has been sick past week with flu. 34 y/o Male with PMHx of HTN, ESRD on home hemodialysis, secondary hyperparathyroidism presents with fatigue, Malaise, loose stools , abdominal bloating , dry cough and mild SOB since Monday. Pt was seen by Dr. Castro (Renal/PCP). Pt's lab results showed hbg of 7.3 was sent to ER for possible blood transfusion. Pt also endorse lack of appetite and low energy , unable to sleep past few days. Abdominal pain with gas pain, that believed that pain was due to gas and used Gas X with minimal relief. Pt denies chest pain, PND,, palpitations, diaphoresis, lightheadedness, dizziness, syncope, increased lowr extremity edema, fever chills,, myalgias, anorexia, weight changes ( loss or gain), nightsweats, generalized fatigue abdominal pain, constipation , BRBPR, melena, urinary symptoms, and wheezing. Pt admits that roommate has been sick past week with flu.    In the ER, pt desatted to 83% on RA , pt placed on 3L NC satting 100%. Spoke with   Pt will go to HD 5am, for now will give 80IVP Lasix and restrict fluids. 32 y/o Male with PMHx of HIV+, HTN, ESRD on home hemodialysis, secondary hyperparathyroidism presents with fatigue, Malaise, loose stools , abdominal bloating , dry cough and mild SOB since Monday. Pt was seen by Dr. Castro (Renal/PCP). Pt's lab results showed hbg of 7.3 was sent to ER for possible blood transfusion. Pt also endorse lack of appetite and low energy , unable to sleep past few days. Abdominal pain with gas pain, that believed that pain was due to gas and used Gas X with minimal relief. Pt denies chest pain, PND,, palpitations, diaphoresis, lightheadedness, dizziness, syncope, increased lowr extremity edema, fever chills,, myalgias, anorexia, weight changes ( loss or gain), nightsweats, generalized fatigue abdominal pain, constipation , BRBPR, melena, urinary symptoms, and wheezing. Pt admits that roommate has been sick past week with flu.    In the ER, pt desatted to 83% on RA , pt placed on 3L NC satting 100%. Spoke with   Pt will go to HD 5am, for now will give 80IVP Lasix and restrict fluids.

## 2020-02-26 NOTE — H&P ADULT - PROBLEM SELECTOR PLAN 3
- Monitor BMP daily w/ mg and phos   - Monitor Cr  - avoid nephrotoxic agents   - 2/27 AM HD   - Renal following - Monitor BMP daily w/ mg and phos   - Monitor Cr  - avoid nephrotoxic agents   - 2/27 AM HD   - Renal following  - In the ER, pt desatted to 83% on RA , pt placed on 3L NC satting 100%. Spoke with   Pt will go to HD 5am, for now will give 80IVP Lasix and restrict fluids. - h/h 8.7/27  - pt was told hbg was 7.3 outpatient   - Iron studies w/ b12 and folate AM  - will hold off transfusion at the moment  will check morning cbc

## 2020-02-26 NOTE — H&P ADULT - NSHPLABSRESULTS_GEN_ALL_CORE
LABORATORY VALUES	 	                          8.7    6.17  )-----------( 175      ( 26 Feb 2020 13:00 )             27.0       02-26    138  |  92<L>  |  102<H>  ----------------------------<  100<H>  5.8<H>   |  17<L>  |  11.76<H>    Ca    9.6      26 Feb 2020 13:00    TPro  8.7<H>  /  Alb  5.2<H>  /  TBili  0.3  /  DBili  x   /  AST  11  /  ALT  19  /  AlkPhos  62  02-26    LIVER FUNCTIONS - ( 26 Feb 2020 13:00 )  Alb: 5.2 g/dL / Pro: 8.7 g/dL / ALK PHOS: 62 u/L / ALT: 19 u/L / AST: 11 u/L / GGT: x           Activated Partial Thromboplastin Time: 39.2 SEC (02-26 @ 13:00)    EKG: NSR@93 TWi Lead l , AvL , v6      CARDIAC MARKERS:  Blood Gas Venous - Lactate: 2.4 mmol/L (02-26 @ 13:00)    CAPILLARY BLOOD GLUCOSE    Chest Xray: Impression: Cardiomegaly. Right lower lobe pneumonia.

## 2020-02-26 NOTE — H&P ADULT - PROBLEM SELECTOR PLAN 1
- RLL pna on chest xray  - continue antibiotics  - continues pulse ox  - O2 Prn  - will send RVP   - monitor closely - RLL PNA on chest xray  - continue antibiotics  - continues pulse ox  - O2 Prn  - will send RVP   - monitor closely - RLL PNA on chest xray  - Will get CT chest ordered  - Procalcitonin AM  - continue antibiotics  - continues pulse ox  - O2 Prn  - will send RVP   - monitor closely

## 2020-02-26 NOTE — H&P ADULT - NSICDXFAMILYHX_GEN_ALL_CORE_FT
FAMILY HISTORY:  Aunt  Still living? Yes, Estimated age: Age Unknown  Family history of systemic lupus erythematosus, Age at diagnosis: Age Unknown

## 2020-02-26 NOTE — PHARMACOTHERAPY INTERVENTION NOTE - COMMENTS
Medication history is complete. Medication list updated in Outpatient Medication Record (OMR). Please call spectra [phone] if you have any questions.

## 2020-02-26 NOTE — CONSULT NOTE ADULT - ASSESSMENT
ASSESSMENT:  Mr. Lemus is a 33 year old  gentleman with significant PMH of HTN, ESRD on home hemodialysis, secondary hyperparathyroidism who presented to [facility] ED with fatigue, malaise, abdominal bloating who was sent by his nephrologist, Dr Castro for blood transfusion. He was reported to have a hemoglobin of 7.3 from his outpatient dialysis unit and had SOB and fatigue and was advised to go the ED in the am. He was found to have right lower lobe pneumonia but his hemoglobin at [facility] is 8.7 after he took 100 mcg of Aranesp yesterday. Nephrology consulted for dialysis needs.     1. ESRD on home hemodialysis. Will dialyze pt tonight.   2. Hyperkalemia. Pt needs dialysis on 2 k bath.   3. Hypervolemic.   4. High anion gap metabolic acidosis.   5. Anemia of ESRD.   6. Secondary hyperparathyroidism of renal origin.   7. HTN that is uncontrolled unsure if he is non compliant. Will give all his home medications.    RECOMMEND:  1. Hemodialysis today for 3hours with 2 liters of UF off on 2 k bath. Hold for SBP<140.   2. Will give all his home medications of Amlodipine 10 mg po daily. Clonidine 0.3 mg TID  Hydralazine 100mg TID, Labetalol 600 mg po TID, Lasix 80 mg po daily and Losartan 100 mg po QHS. Spironlactone 100 mg po BID   3. Will give Sevelamer 800 mg po TID with meals. And hold calcitriol for now.   4. Dose all medications for GFR<10.  5. Low potassium and low phosphorus diet.   6. BMP, CBC, magnesium and phosphorus daily.         Thank you for involving [employer] in this patient's care.    With warm regards,    Maricruz Castro, DO  [employer]  [phone]

## 2020-02-26 NOTE — ED PROVIDER NOTE - PROGRESS NOTE DETAILS
Labs/imaging reviewed, CXR shows RLL PNA, hgb 8.7, will hold transfusion at this time, admit to medicine for PNA and dialysis.

## 2020-02-27 NOTE — CHART NOTE - NSCHARTNOTEFT_GEN_A_CORE
Initial K 5.8 s/p Lokelma and Calcium gluconate 1g and Lasix 80mg IVP, repeated K was 6.2 Pt was given x1 Calcium gluconate, Lokelma, and Insulin. Pt arrived in HD at 5:30 Am. please follow up BMP after HD.

## 2020-02-27 NOTE — PROGRESS NOTE ADULT - PROBLEM SELECTOR PLAN 1
- RLL PNA on chest xray, however CT noted, no PNA< D/Brett ABx   - continues pulse ox, improving O2 sat  - FLu B positive   - monitor closely  - ID eval appreciated  - tamiflu

## 2020-02-27 NOTE — PROGRESS NOTE ADULT - PROBLEM SELECTOR PLAN 2
- Monitor BMP daily w/ mg and phos   - Monitor Cr  - avoid nephrotoxic agents   - serial HD as per renal team

## 2020-02-27 NOTE — CONSULT NOTE ADULT - ASSESSMENT
33 year odl with HIV and ESRD presented for evaluation of cough.    Diagnosed with influenza B      1) Influenza B    Continue tamiflu course through 3/2  Redose with tamilfu 30mg after each HD session    Droplet precautions    2)  Abnormal CT:   Nodular opacity  ? due to flu  Repeat CT in 3-6 months    3) HIV  Continue sustiva/ lamivudine/ viread    CD4 324 in august with undetectabel viral laod    4) ESRD  antivirals dose adjusted

## 2020-02-28 NOTE — PROGRESS NOTE ADULT - PROBLEM/PLAN-1
[facility]  *** FINAL REPORT ***    Name: Maria M Mejia  MRN: [id number]    Inpatient  : 09 Aug 1959  HIS Order #: [id number]  [address] [facility] Visit #: [id number]  Date: 02 Oct 2018    TYPE OF TEST: Cerebrovascular Duplex    REASON FOR TEST  Carotid bruit    Right Carotid:-             Proximal               Mid                 Distal  cm/s  Systolic  Diastolic  Systolic  Diastolic  Systolic  Diastolic  CCA:     90.1      17.0                            62.0      18.0  Bulb:  ECA:     74.0      11.0  ICA:     34.0      10.0       37.0      14.0       32.0      10.0  ICA/CCA:  0.5       0.6    ICA Stenosis: <50%    Right Vertebral:-  Finding: Antegrade  Sys:       40.0  Kathrine:       15.0    Right Subclavian: Normal    Left Carotid:-            Proximal                Mid                 Distal  cm/s  Systolic  Diastolic  Systolic  Diastolic  Systolic  Diastolic  CCA:     43.1      20.0                            73.0      16.0  Bulb:  ECA:     47.0       5.0  ICA:     53.0      16.0       45.0      16.0       42.0      14.0  ICA/CCA:  0.7       1.0    ICA Stenosis: <50%    Left Vertebral:-  Finding: Antegrade  Sys:       42.0  Kathrine:       10.0    Left Subclavian: Normal    INTERPRETATION/FINDINGS  PROCEDURE:  Carotid Duplex Examination using B-mode, color and  spectral Doppler of the extracranial cerebrovascular arteries. 1. Bilateral <50% stenosis of the internal carotid arteries. 2. No significant stenosis in the external carotid arteries  bilaterally. 3. Antegrade flow in both vertebral arteries. 4. Normal flow in both subclavian arteries. Plaque Morphology:  1. Heterogeneous plaque in the right ICA. 2. Heterogeneous plaque in the left ICA. ADDITIONAL COMMENTS    I have personally reviewed the data relevant to the interpretation of  this  study. TECHNOLOGIST: Murray Grande. ANISA Bell, RDMS  Signed: 10/02/2018 01:54 PM    PHYSICIAN: Morenita Devries MD  Signed: 10/02/2018 05:16 PM DISPLAY PLAN FREE TEXT

## 2020-02-28 NOTE — PROGRESS NOTE ADULT - PROBLEM SELECTOR PLAN 1
- RLL PNA on chest xray, however CT noted, no PNA< D/Brett ABx   - continues pulse ox, improving O2 sat, off NC  - FLu B positive   - monitor closely  - ID eval appreciated  - tamiflu

## 2020-02-28 NOTE — PROGRESS NOTE ADULT - PROBLEM SELECTOR PLAN 2
- Monitor BMP daily w/ mg and phos   - Monitor Cr  - avoid nephrotoxic agents   - serial HD as per renal team  Pan CT for secondary causes of HTN

## 2020-02-28 NOTE — PROGRESS NOTE ADULT - PROBLEM SELECTOR PLAN 7
- Continue Home BP meds w/ parameters   - card eval  - secondary HTN W/U  - monitor BP   - DASH/TLC diet

## 2020-02-28 NOTE — PROVIDER CONTACT NOTE (OTHER) - ASSESSMENT
Patient states that since he does not take the heparin at home, he does not want to take it at the hospital.

## 2020-02-29 NOTE — DISCHARGE NOTE PROVIDER - NSDCCPCAREPLAN_GEN_ALL_CORE_FT
PRINCIPAL DISCHARGE DIAGNOSIS  Diagnosis: Pneumonia  Assessment and Plan of Treatment:       SECONDARY DISCHARGE DIAGNOSES  Diagnosis: ESRD (end stage renal disease) on dialysis  Assessment and Plan of Treatment: ESRD (end stage renal disease) on dialysis  You were found to have bilateral renal cysts on CT scan. Please follow up with your nephrologist or primary care provider within 1-2 weeks to discuss. PRINCIPAL DISCHARGE DIAGNOSIS  Diagnosis: Influenza B  Assessment and Plan of Treatment: Continue Tamiflu 30mg after each dialysis session through 3/2/20. CT Chest showing nodular opacities possibly due to flu. Repeat CT Chest in 3-6 months to ensure resolution. Supportive care. Follow up with your primary care physician within 1-2 weeks.      SECONDARY DISCHARGE DIAGNOSES  Diagnosis: ESRD (end stage renal disease) on dialysis  Assessment and Plan of Treatment: Please follow up with your nephrologist for management, and continue your scheduled hemodialysis. You were found to have renal cysts on CT scan. Please follow up with your nephrologist or primary care provider within 1-2 weeks for further management.    Diagnosis: Anemia in ESRD (end-stage renal disease)  Assessment and Plan of Treatment: You received a blood transfusion this admission. Follow up with your primary care physician within 1-2 weeks to monitor your blood counts.    Diagnosis: HTN (hypertension)  Assessment and Plan of Treatment: Low sodium and fat diet. Continue Amlodipine, Labetalol, Clonidine, Hydralazine, Losartan, and Spironolactone as prescribed. Follow up with your Cardiologist for further management. You may follow up with Dr. Guevara; call [phone] for appointment.    Diagnosis: Hyperkalemia  Assessment and Plan of Treatment: Improved. Continue dialysis as scheduled. Follow up with your Primary Care Physician or Nephrologist within 1-2 weeks to monitor your potassium levels. Low potassium diet.    Diagnosis: HIV test positive  Assessment and Plan of Treatment: Continue Sustiva, lamivudine, and viread as prescribed. Follow up with Dr. Joaquin on 3/17/20 at 2:00PM; call [phone] with questions.

## 2020-02-29 NOTE — DISCHARGE NOTE PROVIDER - HOSPITAL COURSE
34 y/o Male with PMHx of HIV, HTN, ESRD on home hemodialysis M/T/W/Th/F, secondary hyperparathyroidism presents with fatigue, Malaise, loose stools, abdominal bloating, dry cough and mild SOB since Monday. Pt is being admitted for ?PNA and symptomatic anemia.        + Influenza B - on Tamiflu (dose each day after HD session), ID following.     + HIV - continue meds    + ESRD - on HD, on transplant list at [facility], Renal following    + AOCD - s/p 1 unit with HD 2/28            Pneumonia.  Plan: - RLL PNA on chest xray    -2/26 CT Chest - Nodular opacities throughout the right lower lobe representing pneumonia with endobronchial spread. A 1 to 3 month follow-up chest CT is recommended to ensure resolution. Cardiomegaly.     - Procalcitonin - 2.95 (Elevated)    - continue antibiotics - s/p 1 dose Zithro and 1 dose Ceftriaxone    - continues pulse ox    - O2 Prn    - RVP - Influenza B        Problem/Plan - 2:    ·  Problem: ESRD on dialysis.  Plan: - Monitor BMP daily w/ mg and phos     - Monitor Cr    - avoid nephrotoxic agents     - 2/27 AM HD     - Renal following    - In the ER, pt desatted to 83% on RA , pt placed on 3L NC satting 100%. Spoke with   Pt will go to HD 5am, for now will give 80IVP Lasix and restrict fluids.         Problem/Plan - 3:    ·  Problem: Anemia.  Plan: - h/h 8.7/27    - pt was told hbg was 7.3 outpatient     - Iron studies w/ b12 and folate AM    - will hold off transfusion at the moment  will check morning cbc.     -s/p 1 unit prbc 2/28        Problem/Plan - 4:    ·  Problem: Elevated lactic acid level.  Plan: - lactate 2.4    - s/p 1L NS bolus by ER    - will repeat Lactate    - monitor.         Problem/Plan - 5:    ·  Problem: Hyperkalemia.  Plan: - K 5.8     - Will get HD Am    - s/p lokelma     - s/p Calcium gluconate     - will repeat BMP    - monitor.         Problem/Plan - 6:    Problem: HIV test positive. Plan: - HIV +     - pt takes medications of Sustiva and lamivudine. unable to recall dosage     - Day team to follow up ID and ordered meds     - Follow Dr. Hernesto Martinez (ID)    - Call Dr. Juan VANN for c/s     - monitor. - HIV +     - Follow Dr. Hernesto Martinez (ID)    - Call Dr. Juan VANN for c/s     - monitor          Problem/Plan - 7:    ·  Problem: HTN (hypertension).  Plan: - Continue Home BP meds w/ parameters     - monitor BP     - DASH/TLC diet.     -home BP meds restarted 2/28 34 y/o Male with PMHx of HIV, HTN, ESRD on home hemodialysis M/T/W/Th/F, secondary hyperparathyroidism presents with fatigue, Malaise, loose stools, abdominal bloating, dry cough and mild SOB since Monday. Pt is being admitted for ?PNA and symptomatic anemia.        1. Influenza B    - RLL consolidation seen on chest xray concerning for PNA    - CT Chest - Nodular opacities throughout the right lower lobe representing pneumonia with endobronchial spread. A 1 to 3 month follow-up chest CT is recommended to ensure resolution. Cardiomegaly.     - s/p CTX and Azithromycin x 1 dose for suspected PNA    - ID consult called: Nodular opacities on CT possibly due to flu, not PNA. Repeat CT in 3-6 months    - RVP positive for Influenza B    - On Tamiflu renally dosed, to be given after HD sessions through 3/2/20        2. ESRD on dialysis.      - continue HD    - avoid nephrotoxic agents     - Renal following    - Renal diet, fluid restriction     - He is active on transplant list for kidney at [facility].        3. Anemia of ESRD    - s/p one unit of packed red blood cells on 2/28        4. Elevated lactic acid level.      - lactate 2.4    - s/p 1L NS bolus by ER    - improved        5. Hyperkalemia.      - improved s/p Lokelma, Ca gluconate and hemodialysis         6. HIV     - ID following    - Continue sustiva/ lamivudine/ viread    - CD4 324 in august with undetectable viral load        7. HTN    - Cards following, Dr. Guevara    - pt on multiple max dose anti-hypertensives at a young age with still poorly controlled HTN    - CT abd/pelvis with IV contrast ruled out EKTA     - BP improved    - TTE pending This is a 33 year old  Male with PMHx of HIV, HTN, ESRD on home hemodialysis M/T/W/Th/F, secondary hyperparathyroidism presents with fatigue, Malaise, loose stools, abdominal bloating, dry cough and mild SOB since Monday. Pt  was admitted for possible PNA and symptomatic anemia but found to have the flu.              1. Influenza B     RVP was positive for Influenza B, Pt was started on Tamiflu renally dosed, given after HD sessions and completed on 3/2/20.  RLL consolidation seen on chest xray concerning for PNA. Patient was started on CTX and Azithromycin x 1 dose for suspected PNA, however  CT Chest - Nodular opacities throughout the right lower lobe representing pneumonia with endobronchial spread. A 1 to 3 month follow-up chest CT is recommended to ensure resolution. Cardiomegaly.   ID consult called: Nodular opacities on CT possibly due to flu, not PNA. Repeat CT in 3-6 months to evaluate nodular opacities.          2. ESRD on dialysis.      Continue scheduled HD days, he is active on transplant list for kidney at [facility].        3. Anemia of ESRD    - s/p one unit of packed red blood cells on 2/28        4. Elevated lactic acid level.      lactate 2.4, s/p 1L NS bolus by ER and improved         5. Hyperkalemia.      - improved s/p Lokelma, Ca gluconate and hemodialysis         6. HIV     - ID following    - Continue sustiva/ lamivudine/ viread    - CD4 324 in august with undetectable viral load        7. HTN    - Cards following, Dr. Guevara    - pt on multiple max dose anti-hypertensives at a young age with still poorly controlled HTN    - CT abd/pelvis with IV contrast- No CT evidence of renal artery stenosis, marked cardiomegaly.  Innumerable bilateral small renal cysts.    - Echo - EF 50% Normal mitral valve. Mild mitral regurgitation.  Moderate concentric left ventricular hypertrophy.  Mild global left ventricular systolic dysfunction.  . Normal right ventricular size and function. This is a 33 year old  Male with PMHx of HIV, HTN, ESRD on home hemodialysis M/T/W/Th/F, secondary hyperparathyroidism presents with fatigue, Malaise, loose stools, abdominal bloating, dry cough and mild SOB since Monday. Pt  was admitted for possible PNA and symptomatic anemia but found to have the flu.              1. Influenza B     RVP was positive for Influenza B, Pt was started on Tamiflu renally dosed, given after HD sessions and completed on 3/2/20.  RLL consolidation seen on chest xray concerning for PNA. Patient was started on CTX and Azithromycin x 1 dose for suspected PNA, however  CT Chest - Nodular opacities throughout the right lower lobe representing pneumonia with endobronchial spread. A 1 to 3 month follow-up chest CT is recommended to ensure resolution. Cardiomegaly.   ID consult called: Nodular opacities on CT possibly due to flu, not PNA. Repeat CT in 3-6 months to evaluate nodular opacities.          2. ESRD on dialysis.      Continue scheduled HD days, he is active on transplant list for kidney at [facility].        3. Anemia of ESRD    - s/p one unit of packed red blood cells on 2/28        4. Elevated lactic acid level.      lactate 2.4, s/p 1L NS bolus by ER and improved         5. Hyperkalemia.      - improved s/p Lokelma, Ca gluconate and hemodialysis         6. HIV     - ID following    - Continue sustiva/ lamivudine/ viread    - CD4 324 in august with undetectable viral load        7. HTN    - Cards following, Dr. Guevara    - pt on multiple max dose anti-hypertensives at a young age with still poorly controlled HTN    - CT abd/pelvis with IV contrast- No CT evidence of renal artery stenosis, marked cardiomegaly.  Innumerable bilateral small renal cysts.    - Echo - EF 50% Normal mitral valve. Mild mitral regurgitation.  Moderate concentric left ventricular hypertrophy.  Mild global left ventricular systolic dysfunction.  . Normal right ventricular size and function.        Medication reconcilliation reviewed with Dr. Tinoco, Pt is cleared for discharge.

## 2020-02-29 NOTE — CHART NOTE - NSCHARTNOTEFT_GEN_A_CORE
PA medicine note    As per radiology, CTA or USN would be more ideal for evaluating renal artery stenosis. Spoke with Dr. De Dios from nephrology regarding CT scan for pt, after discussion will hold of on any imaging of pt until Monday when we can further clarify scan with Dr. Castro. Currently, pt BP over the past 24 hours is 140s/90s.     Ricki Rodriguez PA-C  Pager [phone]. PA medicine note    As per radiology, CTA or USN would be more ideal for evaluating renal artery stenosis. Spoke with Dr. De Dios from nephrology regarding CT scan for pt, after discussion will hold of on any imaging of pt until Monday when we can further clarify scan with Dr. Castro. Currently, pt BP over the past 24 hours is 140s/90s.     Addendum: 2/29/2020 1200pm - spoke with attending, as per discussion will proceed with CTA abdomen and pelvis with IV contrast. Will coordinate scan with BROWN.    Ricki Rodriguez PA-C  Pager [phone].

## 2020-02-29 NOTE — PROGRESS NOTE ADULT - PROBLEM SELECTOR PLAN 2
- Monitor BMP daily w/ mg and phos   - Monitor Cr  - avoid nephrotoxic agents   - serial HD as per renal team  Pan CT for secondary causes of HTN noted

## 2020-02-29 NOTE — DISCHARGE NOTE PROVIDER - CARE PROVIDER_API CALL
Alfredo Guevara (DO)  Cardiovascular Disease; Internal Medicine; Nuclear Cardiology  [address], [address]  [place] [address]  Phone: [phone]  Fax: [phone]  Follow Up Time:     Maricruz Castro (DO)  Internal Medicine  [address]  [place] [address]  Phone: [phone]  Fax: [phone]  Follow Up Time:     Gonsalo Joaquin)  Internal Medicine  [address] [place] [address]  Phone: [phone]  Fax: [phone]  Scheduled Appointment: 03/17/2020 02:00 PM

## 2020-02-29 NOTE — PROGRESS NOTE ADULT - PROBLEM SELECTOR PLAN 1
- RLL PNA on chest xray, however CT noted, no PNA< D/Brett ABx   - FLu B positive   - ID eval appreciated  - tamiflu

## 2020-02-29 NOTE — DISCHARGE NOTE PROVIDER - CARE PROVIDERS DIRECT ADDRESSES
,DirectAddress_Unknown,DirectAddress_Unknown,[email or web address]

## 2020-02-29 NOTE — DISCHARGE NOTE PROVIDER - PROVIDER TOKENS
PROVIDER:[TOKEN:[[id number]]],PROVIDER:[TOKEN:[[id number]]],PROVIDER:[TOKEN:[[id number]],SCHEDULEDAPPT:[03/17/2020],SCHEDULEDAPPTTIME:[02:00 PM]]

## 2020-02-29 NOTE — DISCHARGE NOTE PROVIDER - NSDCMRMEDTOKEN_GEN_ALL_CORE_FT
amLODIPine 10 mg oral tablet: 1 tab(s) orally once a day  cloNIDine 0.3 mg oral tablet: 1 tab(s) orally 3 times a day  Fish Oil oral capsule: 2 cap(s) orally once a day  OTC supplement  furosemide 80 mg oral tablet: 1 tab(s) orally once a day  hydrALAZINE 100 mg oral tablet: 1 tab(s) orally 3 times a day  labetalol 200 mg oral tablet: 3 tab(s) orally 3 times a day  losartan 50 mg oral tablet: 1 tab(s) orally 2 times a day  Multiple Vitamins oral tablet: 1 tab(s) orally once a day  spironolactone 100 mg oral tablet: 1 tab(s) orally 2 times a day aluminum hydroxide-magnesium hydroxide 200 mg-200 mg/5 mL oral suspension: 30 milliliter(s) orally every 4 hours, As needed, Dyspepsia  amLODIPine 10 mg oral tablet: 1 tab(s) orally once a day  cloNIDine 0.3 mg oral tablet: 1 tab(s) orally 3 times a day  efavirenz 600 mg oral tablet: 1 tab(s) orally every 24 hours  Fish Oil oral capsule: 2 cap(s) orally once a day  OTC supplement  hydrALAZINE 100 mg oral tablet: 1 tab(s) orally 3 times a day  labetalol 200 mg oral tablet: 3 tab(s) orally 3 times a day  lamiVUDine 5 mg/mL oral solution: 15 milliliter(s) orally once a day  losartan 50 mg oral tablet: 1 tab(s) orally 2 times a day  Multiple Vitamins oral tablet: 1 tab(s) orally once a day  sevelamer carbonate 800 mg oral tablet: 3 tab(s) orally 3 times a day (with meals)  tenofovir disoproxil fumarate 300 mg oral tablet: 1 tab(s) orally

## 2020-02-29 NOTE — PROGRESS NOTE ADULT - PROBLEM SELECTOR PLAN 7
- Continue Home BP meds w/ parameters   - card eval  - secondary HTN W/U noted   - monitor BP   - DASH/TLC diet

## 2020-03-01 NOTE — PROVIDER CONTACT NOTE (OTHER) - ASSESSMENT
Patient denies chest pain, headache, or shortness of breath. Patient is currently asymptomatic. Vitals documented.

## 2020-03-02 NOTE — DISCHARGE NOTE NURSING/CASE MANAGEMENT/SOCIAL WORK - PATIENT PORTAL LINK FT
You can access the FollowMyHealth Patient Portal offered by [facility] by registering at the following website: [email or web address]. By joining [facility]’s FollowMyHealth portal, you will also be able to view your health information using other applications (apps) compatible with our system.

## 2020-03-02 NOTE — PROGRESS NOTE ADULT - ASSESSMENT
Mr. Lemus is a 33 year old  gentleman with significant PMH of HTN, ESRD on home hemodialysis, secondary hyperparathyroidism who presented to [facility] ED with fatigue, malaise, abdominal bloating who was sent by his nephrologist, Dr Castro for blood transfusion. He was reported to have a hemoglobin of 7.6 from his outpatient dialysis unit and had SOB and fatigue and was advised to go the ED in the am. He was found to have influenza B his hemoglobin at [facility] is 7.9  after he took 100 mcg of Aranesp on Tuesday. Nephrology consulted for dialysis needs.     1. ESRD on home hemodialysis. On dialysis daily Monday, Tuesday, Wednesday, Thursday and Friday. - Hemodialysis was done on 2/27 and 2/28  2. Anemia of ESRD- s/p one unit of packed red blood cells on 2/28  3. Secondary hyperparathyroidism of renal origin.   4. HTN that is controlled   5.CV with possible EF that is reduced. HFrEF.   6. CT abd and pelvis- No CT evidence of renal artery stenosis ans renal cysts b/l      RECOMMEND:  1. Hemodialysis today for 3hours as ordered  2. Home Medications- AmLodipine 10 mg po daily, Clonidine 0.3 mg TID, Hydralazine 100mg TID, Labetalol 600 mg po TID, and Losartan 100 mg po QHS. Spironlactone 100 mg po BID   3. Sevelamer 2400 mg po TID with meals.   4. Dose all medications for GFR<10.  5. low phosphorus diet.   6. BMP, CBC, magnesium and phosphorus daily.   7. He is active on transplant list for kidney at [facility].   9. Echocardiogram
32 y/o Male with PMHx of HIV+,HTN, ESRD on home hemodialysis, secondary hyperparathyroidism presents with fatigue, Malaise, loose stools , abdominal bloating , dry cough and mild SOB since Monday. Pt is being admitted for PNA and symptomatic anemia.
32 y/o Male with PMHx of HIV+,HTN, ESRD on home hemodialysis, secondary hyperparathyroidism presents with fatigue, Malaise, loose stools , abdominal bloating , dry cough and mild SOB since Monday. Pt is being admitted for PNA and symptomatic anemia.
33 year odl with HIV and ESRD presented for evaluation of cough.    Diagnosed with influenza B      1) Influenza B      Redose with tamilfu 30mg after each HD session  Continue tamiflu course through 3/2  Droplet precautions    2)  Abnormal CT:   Nodular opacity  ? due to flu  Repeat CT in 3-6 months    3) HIV  Continue sustiva/ lamivudine/ viread    CD4 324 in august with undetectable viral load    4) ESRD  antivirals dose adjusted    No ID contraindication to discharge.  Can take tamiflu 30 mg on each day he gets HD through 3/2
33 year odl with HIV and ESRD presented for evaluation of cough.    Diagnosed with influenza B      1) Influenza B  completed tx course  Off droplet    2)  Abnormal CT:   Nodular opacity  ? due to flu  Repeat CT in 3-6 months    3) HIV  Continue sustiva/ lamivudine/ viread    CD4 324 in august with undetectable viral load    4) ESRD  antivirals dose adjusted    Will sign off
34 y/o Male with PMHx of HIV+,HTN, ESRD on home hemodialysis, secondary hyperparathyroidism presents with fatigue, Malaise, loose stools , abdominal bloating , dry cough and mild SOB since Monday. Pt is being admitted for PNA and symptomatic anemia.
ASSESSMENT/PLAN      ASSESSMENT:  Mr. Lemus is a 33 year old  gentleman with significant PMH of HTN, ESRD on home hemodialysis, secondary hyperparathyroidism who presented to [facility] ED with fatigue, malaise, abdominal bloating who was sent by his nephrologist, Dr Castro for blood transfusion. He was reported to have a hemoglobin of 7.6 from his outpatient dialysis unit and had SOB and fatigue and was advised to go the ED in the am. He was found to have influenza B his hemoglobin at [facility] is 7.9  after he took 100 mcg of Aranesp on Tuesday. Nephrology consulted for dialysis needs.     1. ESRD on home hemodialysis. On dialysis daily Monday, Tuesday, Wednesday, Thursday and Friday.   2. Hyponatremia.   3. Hypervolemic.   4. High anion gap metabolic acidosis.   5. Anemia of ESRD.   6. Secondary hyperparathyroidism of renal origin.   7. HTN that is controlled . Today did not receive all his BP medications. .   8. CV with possible EF that is reduced. HFrEF.     RECOMMEND:  1. Will have Hemodialysis today for 3hours with 2.5  liters of UF off on 3 k bath. Hold for SBP<140. Again HD tomorrow.    2. Will give all his home medications of Amlodipine 10 mg po daily. Clonidine 0.3 mg TID  Hydralazine 100mg TID, Labetalol 600 mg po TID, and Losartan 100 mg po QHS. Spironlactone 100 mg po BID   3. Will increase Sevelamer 2400 mg po TID with meals. And hold calcitriol for now.   4. Dose all medications for GFR<10.  5. Low potassium and low phosphorus diet.   6. BMP, CBC, magnesium and phosphorus daily.   7. He is active on transplant list for kidney at [facility].   8. Echocardiogram results. pending. He had CT of abdomen and pelvis with contrast that showed no renal artery stenosis. But need CTA. Will defer as outpatient.           Maricruz Castro, DO  [employer]  [phone]
ASSESSMENT/PLAN    ASSESSMENT:  Mr. Lemus is a 33 year old  gentleman with significant PMH of HTN, ESRD on home hemodialysis, secondary hyperparathyroidism who presented to [facility] ED with fatigue, malaise, abdominal bloating who was sent by his nephrologist, Dr Castro for blood transfusion. He was reported to have a hemoglobin of 7.6 from his outpatient dialysis unit and had SOB and fatigue and was advised to go the ED in the am. He was found to have influenza B his hemoglobin at [facility] is 7.9  after he took 100 mcg of Aranesp on Tuesday. Nephrology consulted for dialysis needs.     1. ESRD on home hemodialysis. On dialysis daily Monday, Tuesday, Wednesday, Thursday and Friday.   2. Electrolytes are acceptable. .   3. Hypervolemic.   4. High anion gap metabolic acidosis.   5. Anemia of ESRD.   6. Secondary hyperparathyroidism of renal origin.   7. HTN that is controlled . BP is now 139/94 the lowest blood pressure.   8. CV with possible EF that is reduced. HFrEF.     RECOMMEND:  1. Had Hemodialysis today for 3hours with 2 liters of UF off on 2 k bath. Hold for SBP<140. Again HD tomorrow.    2. Will give all his home medications of Amlodipine 10 mg po daily. Clonidine 0.3 mg TID  Hydralazine 100mg TID, Labetalol 600 mg po TID, and Losartan 100 mg po QHS. Spironlactone 100 mg po BID   3. Will increase Sevelamer 2400 mg po TID with meals. And hold calcitriol for now.   4. Dose all medications for GFR<10.  5. Low potassium and low phosphorus diet.   6. BMP, CBC, magnesium and phosphorus daily.   7. Had one unit of packed red blood cells.   8. He is active on transplant list for kidney at [facility].   9. Echocardiogram and getting CT of abdomen and pelvis with contrast in addition to CT of thoracic area.             Maricruz Castro, DO  [employer]  [phone]
ASSESSMENT/PLAN  ASSESSMENT:  Mr. Lemsu is a 33 year old  gentleman with significant PMH of HTN, ESRD on home hemodialysis, secondary hyperparathyroidism who presented to [facility] ED with fatigue, malaise, abdominal bloating who was sent by his nephrologist, Dr Castro for blood transfusion. He was reported to have a hemoglobin of 7.3 from his outpatient dialysis unit and had SOB and fatigue and was advised to go the ED in the am. He was found to have right lower lobe pneumonia but his hemoglobin at [facility] is 8.7 after he took 100 mcg of Aranesp yesterday. Nephrology consulted for dialysis needs.     1. ESRD on home hemodialysis. On dialysis daily Monday, Tuesday, Wednesday, Thursday and Friday.   2. Hyperkalemia. Pt dialyzed on 2 k bath.   3. Hypervolemic.   4. High anion gap metabolic acidosis.   5. Anemia of ESRD.   6. Secondary hyperparathyroidism of renal origin.   7. HTN that is uncontrolled. On all medications and his BP still is 163/105    RECOMMEND:  1. Had Hemodialysis today for 3hours with 2.6 liters of UF off on 2 k bath. Hold for SBP<140. Again HD tomorrow.    2. Will give all his home medications of Amlodipine 10 mg po daily. Clonidine 0.3 mg TID  Hydralazine 100mg TID, Labetalol 600 mg po TID, Lasix 80 mg po daily and Losartan 100 mg po QHS. Spironlactone 100 mg po BID   3. Will give Sevelamer 800 mg po TID with meals. And hold calcitriol for now.   4. Dose all medications for GFR<10.  5. Low potassium and low phosphorus diet.   6. BMP, CBC, magnesium and phosphorus daily.   7. If hemoglobin is below <8.0 tomorrow in am give blood transfusion with CMV neg irridated blood.   8. He is active on transplant for kidney.         Thank you for involving [employer] in this patient's care.    With warm regards,    Maricruz Castro, DO  [employer]  [phone]
ASSESSMENT:   Mr. Lemus is a 33 year old  gentleman with significant PMH of HTN, ESRD on home hemodialysis, secondary hyperparathyroidism who presented to [facility] ED with fatigue, malaise, abdominal bloating who was sent by his nephrologist, Dr Castro for blood transfusion. He was reported to have a hemoglobin of 7.6 from his outpatient dialysis unit and had SOB and fatigue and was advised to go the ED in the am. He was found to have influenza B his hemoglobin at [facility] is 7.9  after he took 100 mcg of Aranesp on Tuesday. Nephrology consulted for dialysis needs.     1. ESRD on home hemodialysis. On dialysis daily Monday, Tuesday, Wednesday, Thursday and Friday. - Hemodialysis was done on 2/27, 2/28, and yesterday 2/29/20  2. Anemia of ESRD- improved to 10.7 s/p one unit of packed red blood cells on 2/28  3. Secondary hyperparathyroidism of renal origin.   4. HTN that is controlled - BP improved today   5.CV with possible EF that is reduced. HFrEF.   6. CT abd and pelvis- No CT evidence of renal artery stenosis ans renal cysts b/l    RECOMMEND:  1. Plan HD   2. Home Medications- Amlodipine 10 mg po daily, Clonidine 0.3 mg TID, Hydralazine 100mg TID, Labetalol 600 mg po TID, and Losartan 100 mg po QHS. Spironlactone 100 mg po BID   3. Sevelamer 2400 mg po TID with meals.   4. Dose all medications for GFR<10.  5. low phosphorus diet.   6. BMP, CBC, magnesium and phosphorus daily.   7. He is active on transplant list for kidney at [facility].   8. Echocardiogram    Rowena Shafer, NP-C  [facility]  [phone]

## 2020-03-02 NOTE — PROGRESS NOTE ADULT - PROVIDER SPECIALTY LIST ADULT
Cardiology
Infectious Disease
Infectious Disease
Internal Medicine
Nephrology

## 2020-03-02 NOTE — PROGRESS NOTE ADULT - ATTENDING COMMENTS
Advanced care planning was discussed with patient and family.  Advanced care planning forms were reviewed and discussed.
Advanced care planning was discussed with patient and family.  Advanced care planning forms were reviewed and discussed.  Differential diagnosis and plan of care discussed with patient after the evaluation.   Pain assessed and judicious use of narcotics when appropriate was discussed.  Importance of Fall prevention discussed.  Counseling on Smoking and Alcohol cessation was offered when appropriate.  Counseling on Diet, exercise, and medication compliance was done.
Advanced care planning was discussed with patient and family.  Advanced care planning forms were reviewed and discussed.
Advanced care planning was discussed with patient and family.  Advanced care planning forms were reviewed and discussed.    D/C home today  discussed with house staff and pt
Advanced care planning was discussed with patient and family.  Advanced care planning forms were reviewed and discussed.    D/C planing with return to his routine HD

## 2020-03-02 NOTE — PROGRESS NOTE ADULT - PROBLEM SELECTOR PLAN 7
- Continue Home BP meds w/ parameters   - card eval  - secondary HTN W/U noted   - monitor BP   - DASH/TLC diet  - Echo noted  - Systolic dysfunction, pt states that it has been chronic and its not new. will follow up outpatient

## 2020-03-02 NOTE — PROGRESS NOTE ADULT - PROBLEM SELECTOR PLAN 2
- Monitor BMP daily w/ mg and phos   - Monitor Cr  - avoid nephrotoxic agents   - serial HD as per renal team  - Pan CT for secondary causes of HTN noted

## 2020-03-02 NOTE — PROVIDER CONTACT NOTE (OTHER) - ACTION/TREATMENT ORDERED:
Coordinate with team that pt needs to have his BP meds before d/c or after HD.
Keep pt on 3 K bath, no further orders
Administered blood pressure medications as per order. Will continue to monitor.
Give Hydralazine 100mg po and follow up BP 1hr later for the next schedule.  report given to primary RN Makenzie
Will continue to monitor.
Will continue to monitor.

## 2020-03-02 NOTE — PROGRESS NOTE ADULT - NSHPATTENDINGPLANDISCUSS_GEN_ALL_CORE
house staff, renal team. pt at the bedside

## 2020-03-02 NOTE — PROGRESS NOTE ADULT - SUBJECTIVE AND OBJECTIVE BOX
Patient seen and examined in bed.  he is waiting for HD today.  /71    REVIEW OF SYSTEMS:  As per HPI, otherwise 8 full 10 ROS were unremarkable    MEDICATIONS  (STANDING):  amLODIPine   Tablet 10 milliGRAM(s) Oral daily  chlorhexidine 4% Liquid 1 Application(s) Topical daily  cloNIDine 0.3 milliGRAM(s) Oral three times a day  efavirenz Tablet 600 milliGRAM(s) Oral every 24 hours  heparin  Injectable 5000 Unit(s) SubCutaneous every 8 hours  hydrALAZINE 100 milliGRAM(s) Oral three times a day  labetalol 600 milliGRAM(s) Oral three times a day  lamiVUDine Solution 75 milliGRAM(s) Oral daily  losartan 100 milliGRAM(s) Oral at bedtime  multivitamin 1 Tablet(s) Oral daily  sevelamer carbonate 2400 milliGRAM(s) Oral three times a day with meals  spironolactone 100 milliGRAM(s) Oral every 12 hours  tenofovir disoproxil fumarate (VIREAD) 300 milliGRAM(s) Oral <User Schedule>      VITAL:  T(C): , Max: 36.9 (02-28-20 @ 21:41)  T(F): , Max: 98.5 (02-28-20 @ 21:41)  HR: 73 (02-29-20 @ 11:55)  BP: 131/71 (02-29-20 @ 11:55)  BP(mean): 85 (02-29-20 @ 11:55)  RR: 18 (02-29-20 @ 11:55)  SpO2: 100% (02-29-20 @ 11:55)  Wt(kg): --    I and O's:    02-28 @ 07:01  -  02-29 @ 07:00  --------------------------------------------------------  IN: 2200 mL / OUT: 4050 mL / NET: -1850 mL          PHYSICAL EXAM:    General: NAD; Alert  HEENT:  NCAT; PERRLA  Neck: No JVD; supple  Respiratory: CTA-b/l  Cardiac: RRR s1s2  Gastrointestinal: BS+, soft, NT/ND  Urologic: No tapia  Extremities: No peripheral edema  Access: Left AVF with button holes.    LABS:                        10.3   2.88  )-----------( 198      ( 29 Feb 2020 07:30 )             30.9     02-29    132<L>  |  88<L>  |  59<H>  ----------------------------<  86  4.2   |  23  |  8.32<H>    Ca    9.0      29 Feb 2020 07:30  Phos  8.2     02-29  Mg     2.2     02-29
Follow Up:      Inverval History/ROS:Patient is a 34y old  Male who presents with a chief complaint of Cough and Anemia (02 Mar 2020 16:40)    No fever  No cough  No SOB    Allergies    No Known Allergies    Intolerances        ANTIMICROBIALS:  efavirenz Tablet 600 every 24 hours  lamiVUDine Solution 75 daily  tenofovir disoproxil fumarate (VIREAD) 300 <User Schedule>      OTHER MEDS:  aluminum hydroxide/magnesium hydroxide/simethicone Suspension 30 milliLiter(s) Oral every 4 hours PRN  amLODIPine   Tablet 10 milliGRAM(s) Oral daily  chlorhexidine 4% Liquid 1 Application(s) Topical daily  cloNIDine 0.3 milliGRAM(s) Oral three times a day  heparin  Injectable 5000 Unit(s) SubCutaneous every 8 hours  hydrALAZINE 100 milliGRAM(s) Oral three times a day  labetalol 600 milliGRAM(s) Oral three times a day  losartan 100 milliGRAM(s) Oral at bedtime  multivitamin 1 Tablet(s) Oral daily  sevelamer carbonate 2400 milliGRAM(s) Oral three times a day with meals      Vital Signs Last 24 Hrs  T(C): 37.3 (02 Mar 2020 16:20), Max: 37.3 (02 Mar 2020 16:20)  T(F): 99.1 (02 Mar 2020 16:20), Max: 99.1 (02 Mar 2020 16:20)  HR: 80 (02 Mar 2020 16:20) (73 - 85)  BP: 163/115 (02 Mar 2020 16:20) (128/88 - 163/115)  BP(mean): 3 (02 Mar 2020 12:21) (3 - 3)  RR: 18 (02 Mar 2020 16:20) (16 - 18)  SpO2: 99% (02 Mar 2020 16:20) (98% - 100%)    PHYSICAL EXAM:  General: [x ] non-toxic  HEAD/EYES: [ ] PERRL [ x] white sclera [ ] icterus  ENT:  [ ] normal x[ ] supple [ ] thrush [ ] pharyngeal exudate  Cardiovascular:   [ ] murmur [x ] normal [ ] PPM/AICD  Respiratory:  [ x] clear to ausculation bilaterally  GI:  [x ] soft, non-tender, normal bowel sounds  :  [ ] tapia [x ] no CVA tenderness   Musculoskeletal:  [ ] no synovitis  Neurologic:  [ ] non-focal exam   Skin:  [x ] no rash  Lymph: [ ] no lymphadenopathy  Psychiatric:  [ ] appropriate affect [ x] alert & oriented  Lines:  [x ] no phlebitis [ ] central line                                10.4   2.86  )-----------( 230      ( 02 Mar 2020 16:49 )             31.7       03-02    133<L>  |  91<L>  |  56<H>  ----------------------------<  86  4.2   |  20<L>  |  9.50<H>    Ca    9.4      02 Mar 2020 16:49  Phos  4.8     03-02  Mg     2.4     03-02            MICROBIOLOGY:    RADIOLOGY:
Follow Up:      Inverval History/ROS:Patient is a 34y old  Male who presents with a chief complaint of Cough and Anemia (2020 15:42)    Feels better   No fever    Allergies    No Known Allergies    Intolerances        ANTIMICROBIALS:  efavirenz Tablet 600 every 24 hours  lamiVUDine Solution 75 daily  tenofovir disoproxil fumarate (VIREAD) 300 <User Schedule>      OTHER MEDS:  aluminum hydroxide/magnesium hydroxide/simethicone Suspension 30 milliLiter(s) Oral every 4 hours PRN  amLODIPine   Tablet 10 milliGRAM(s) Oral daily  chlorhexidine 4% Liquid 1 Application(s) Topical daily  cloNIDine 0.3 milliGRAM(s) Oral three times a day  hydrALAZINE 100 milliGRAM(s) Oral three times a day  labetalol 600 milliGRAM(s) Oral three times a day  losartan 100 milliGRAM(s) Oral at bedtime  multivitamin 1 Tablet(s) Oral daily  sevelamer carbonate 800 milliGRAM(s) Oral three times a day  spironolactone 100 milliGRAM(s) Oral every 12 hours      Vital Signs Last 24 Hrs  T(C): 37.1 (2020 15:08), Max: 37.8 (2020 22:13)  T(F): 98.7 (2020 15:08), Max: 100.1 (2020 22:13)  HR: 74 (2020 15:08) (73 - 87)  BP: 139/94 (2020 15:08) (139/94 - 163/105)  BP(mean): 127 (2020 09:25) (127 - 127)  RR: 18 (2020 15:08) (16 - 18)  SpO2: 98% (2020 15:08) (92% - 100%)    PHYSICAL EXAM:  General: x ] non-toxic  HEAD/EYES: [ ] PERRL [ ] white sclera [ ] icterus  ENT:  [ ] normal [x ] supple [ ] thrush [ ] pharyngeal exudate  Cardiovascular:   [ ] murmur [x ] normal [ ] PPM/AICD  Respiratory:  [x ] clear to ausculation bilaterally  GI:  [x ] soft, non-tender, normal bowel sounds  :  [ ] tapia [x ] no CVA tenderness   Musculoskeletal:  [ ] no synovitis  Neurologic:  [ ] non-focal exam   Skin:  [x ] no rash  Lymph: [ ] no lymphadenopathy  Psychiatric:  [ ] appropriate affect [x ] alert & oriented  Lines:  [x no phlebitis [ ] central line                                7.9    3.04  )-----------( 178      ( 2020 06:00 )             24.1       02-    134<L>  |  91<L>  |  75<H>  ----------------------------<  90  4.4   |  22  |  9.09<H>    Ca    8.6      2020 06:00  Phos  9.0       Mg     2.2         TPro  7.6  /  Alb  4.5  /  TBili  0.2  /  DBili  x   /  AST  19  /  ALT  22  /  AlkPhos  55        Urinalysis Basic - ( 2020 08:12 )    Color: LIGHT YELLOW / Appearance: CLEAR / S.013 / pH: 6.5  Gluc: NEGATIVE / Ketone: NEGATIVE  / Bili: NEGATIVE / Urobili: NORMAL   Blood: NEGATIVE / Protein: 300 / Nitrite: NEGATIVE   Leuk Esterase: NEGATIVE / RBC: 3-5 / WBC 3-5   Sq Epi: FEW / Non Sq Epi: x / Bacteria: NEGATIVE        MICROBIOLOGY:    RADIOLOGY:
NEPHROLOGY       Patient seen and examined sitting in bed. Pt reports feeling good, eager to go home. Last HD last night (2/29/20) removed 2L.     MEDICATIONS  (STANDING):  amLODIPine   Tablet 10 milliGRAM(s) Oral daily  chlorhexidine 4% Liquid 1 Application(s) Topical daily  cloNIDine 0.3 milliGRAM(s) Oral three times a day  efavirenz Tablet 600 milliGRAM(s) Oral every 24 hours  heparin  Injectable 5000 Unit(s) SubCutaneous every 8 hours  hydrALAZINE 100 milliGRAM(s) Oral three times a day  labetalol 600 milliGRAM(s) Oral three times a day  lamiVUDine Solution 75 milliGRAM(s) Oral daily  losartan 100 milliGRAM(s) Oral at bedtime  multivitamin 1 Tablet(s) Oral daily  sevelamer carbonate 2400 milliGRAM(s) Oral three times a day with meals  spironolactone 100 milliGRAM(s) Oral every 12 hours  tenofovir disoproxil fumarate (VIREAD) 300 milliGRAM(s) Oral <User Schedule>    VITALS:  T(C): , Max: 37.1 (03-01-20 @ 01:01)  T(F): , Max: 98.8 (03-01-20 @ 01:01)  HR: 75 (03-01-20 @ 17:10)  BP: 134/98 (03-01-20 @ 17:10)  RR: 18 (03-01-20 @ 17:10)  SpO2: 100% (03-01-20 @ 17:10)    I and O's:    02-29 @ 07:01  -  03-01 @ 07:00  --------------------------------------------------------  IN: 1950 mL / OUT: 3300 mL / NET: -1350 mL    03-01 @ 07:01  -  03-01 @ 18:00  --------------------------------------------------------  IN: 1000 mL / OUT: 300 mL / NET: 700 mL    PHYSICAL EXAM:  General: NAD; Alert  HEENT:  NCAT; PERRLA  Neck: No JVD; supple  Respiratory: CTA-b/l  Cardiac: RRR s1s2  Gastrointestinal: BS+, soft, NT/ND  Urologic: No tapia  Extremities: No peripheral edema  Access: Left AVF with button holes.    LABS:                        10.7   3.10  )-----------( 210      ( 01 Mar 2020 06:45 )             31.5     03-01    133<L>  |  91<L>  |  32<H>  ----------------------------<  93  3.5   |  24  |  5.77<H>    Ca    9.5      01 Mar 2020 06:45  Phos  4.9     03-01  Mg     2.2     03-01        RADIOLOGY & ADDITIONAL STUDIES:      EXAM:  CT ANGIO ABD PELV ([id number])AW IC      PROCEDURE DATE:  Feb 29 2020     INTERPRETATION:  CLINICAL INFORMATION: 35-year-old male with hypertension. Renal failure on hemodialysis. Assess for renal artery stenosis.    COMPARISON: CT chest, abdomen and pelvis 7/18/2015.    PROCEDURE:   CT Angiography of the Abdomen and Pelvis.  Arterial phase was acquired.  Intravenous contrast: 90 ml Omnipaque 350. 10 ml discarded.  Oral contrast: None.  Sagittal and coronal reformats were performed as well as 3D reconstructions.      FINDINGS:    LOWER CHEST: Minimal dependent linear atelectasis. Cardiomegaly.    LIVER: Within normal limits.  BILE DUCTS: Normal caliber.  GALLBLADDER: Within normal limits.  SPLEEN: Within normal limits.  PANCREAS: Withinnormal limits.  ADRENALS: Within normal limits.  KIDNEYS/URETERS: Innumerable bilateral small renal cysts. No hydronephrosis.    BLADDER: Within normal limits.  REPRODUCTIVE ORGANS: Prostate within normal limits.    BOWEL: No bowel obstruction. Appendicoliths are noted without evidence of appendicitis. PERITONEUM: No ascites.  Vessels: No aortic aneurysm. The celiac axis, superior mesenteric and inferior mesenteric arteries are patent. The bilateral renal arteries are patent without evidence of stenosis.  RETROPERITONEUM/LYMPH NODES: No lymphadenopathy.    ABDOMINAL WALL: Within normal limits.  BONES: Within normal limits.    IMPRESSION:     No CT evidence of renal artery stenosis.    Marked cardiomegaly.    Innumerable bilateral small renal cysts.      SANDEE CERON MD,RADIOLOGY FELLOW  This document has been electronically signed.  GREGOR LOZA M.D., ATTENDING RADIOLOGIST  This document has been electronically signed. Feb 29 20
No pain, has shortness of breath. He had 3 liters of UF removed today. He is feeling better but was diagnosed with influenza B.     Review of systems: All 10 points ROS was obtained except as above.     aluminum hydroxide/magnesium hydroxide/simethicone Suspension 30 milliLiter(s) Oral every 4 hours PRN  amLODIPine   Tablet 10 milliGRAM(s) Oral daily  chlorhexidine 4% Liquid 1 Application(s) Topical daily  cloNIDine 0.3 milliGRAM(s) Oral three times a day  efavirenz Tablet 600 milliGRAM(s) Oral every 24 hours  hydrALAZINE 100 milliGRAM(s) Oral three times a day  labetalol 600 milliGRAM(s) Oral three times a day  lamiVUDine Solution 75 milliGRAM(s) Oral daily  losartan 100 milliGRAM(s) Oral at bedtime  multivitamin 1 Tablet(s) Oral daily  sevelamer carbonate 800 milliGRAM(s) Oral three times a day  spironolactone 100 milliGRAM(s) Oral every 12 hours  tenofovir disoproxil fumarate (VIREAD) 300 milliGRAM(s) Oral <User Schedule>      VITAL:  T(C): , Max: 37.8 (20 @ 14:35)  T(F): , Max: 100.1 (20 @ 22:13)  HR: 79 (20 @ 22:13)  BP: 163/105 (20 @ 22:13)  RR: 16 (20 @ 22:13)  SpO2: 100% (20 @ 22:13)    20 @ 07:01  -  20 @ 22:22  --------------------------------------------------------  IN: 700 mL / OUT: 3000 mL / NET: -2300 mL        PHYSICAL EXAM:  General: NAD; Alert  HEENT:  NCAT; PERRLA  Neck: No JVD; supple  Respiratory: CTA-b/l  Cardiac: RRR s1s2  Gastrointestinal: BS+, soft, NT/ND  Urologic: No tapia  Extremities: No peripheral edema  Access: Left AVF with positive thrill and bruit.     LABS:                          7.5    3.93  )-----------( 152      ( 2020 06:00 )             23.4     Na(133)/K(6.0)/Cl(89)/HCO3(15)/BUN(118)/Cr(12.67)Glu(84)/Ca(9.0)/Mg(--)/PO4(--)     @ 06:40  Na(134)/K(6.2)/Cl(90)/HCO3(16)/BUN(113)/Cr(12.75)Glu(96)/Ca(9.3)/Mg(2.2)/PO4(12.1)     @ 00:50  Na(138)/K(5.8)/Cl(92)/HCO3(17)/BUN(102)/Cr(11.76)Glu(100)/Ca(9.6)/Mg(--)/PO4(--)     @ 13:00        133<L>  |  89<L>  |  118<H>  ----------------------------<  84  6.0<H>   |  15<L>  |  12.67<H>    Ca    9.0      2020 06:40  Phos  12.1       Mg     2.2         TPro  7.6  /  Alb  4.5  /  TBili  0.2  /  DBili  x   /  AST  19  /  ALT  22  /  AlkPhos  55      Urinalysis Basic - ( 2020 08:12 )    Color: LIGHT YELLOW / Appearance: CLEAR / S.013 / pH: 6.5  Gluc: NEGATIVE / Ketone: NEGATIVE  / Bili: NEGATIVE / Urobili: NORMAL   Blood: NEGATIVE / Protein: 300 / Nitrite: NEGATIVE   Leuk Esterase: NEGATIVE / RBC: 3-5 / WBC 3-5   Sq Epi: FEW / Non Sq Epi: x / Bacteria: NEGATIVE
No pain, no shortness of breath.     Review of systems: All 10 points ROS was obtained except as above.     aluminum hydroxide/magnesium hydroxide/simethicone Suspension 30 milliLiter(s) Oral every 4 hours PRN  amLODIPine   Tablet 10 milliGRAM(s) Oral daily  chlorhexidine 4% Liquid 1 Application(s) Topical daily  cloNIDine 0.3 milliGRAM(s) Oral three times a day  efavirenz Tablet 600 milliGRAM(s) Oral every 24 hours  hydrALAZINE 100 milliGRAM(s) Oral three times a day  labetalol 600 milliGRAM(s) Oral three times a day  lamiVUDine Solution 75 milliGRAM(s) Oral daily  losartan 100 milliGRAM(s) Oral at bedtime  multivitamin 1 Tablet(s) Oral daily  sevelamer carbonate 800 milliGRAM(s) Oral three times a day  spironolactone 100 milliGRAM(s) Oral every 12 hours  tenofovir disoproxil fumarate (VIREAD) 300 milliGRAM(s) Oral <User Schedule>      VITAL:  T(C): , Max: 37.8 (20 @ 22:13)  T(F): , Max: 100.1 (20 @ 22:13)  HR: 74 (20 @ 15:08)  BP: 139/94 (20 @ 15:08)  BP(mean): 127 (20 @ 09:25)  RR: 18 (20 @ 15:08)  SpO2: 98% (20 @ 15:08)  Wt(kg): --    20 @ 07:01  -  20 @ 07:00  --------------------------------------------------------  IN: 1050 mL / OUT: 3000 mL / NET: -1950 mL    20 @ 07:01  -  20 @ 15:43  --------------------------------------------------------  IN: 700 mL / OUT: 2900 mL / NET: -2200 mL        PHYSICAL EXAM:  General: NAD; Alert  HEENT:  NCAT; PERRLA  Neck: No JVD; supple  Respiratory: CTA-b/l  Cardiac: RRR s1s2  Gastrointestinal: BS+, soft, NT/ND  Urologic: No tapia  Extremities: No peripheral edema  Access: Left AVF with button holes.     LABS:                          7.9    3.04  )-----------( 178      ( 2020 06:00 )             24.1     Na(134)/K(4.4)/Cl(91)/HCO3(22)/BUN(75)/Cr(9.09)Glu(90)/Ca(8.6)/Mg(2.2)/PO4(9.0)     @ 06:00  Na(133)/K(6.0)/Cl(89)/HCO3(15)/BUN(118)/Cr(12.67)Glu(84)/Ca(9.0)/Mg(--)/PO4(--)     @ 06:40  Na(134)/K(6.2)/Cl(90)/HCO3(16)/BUN(113)/Cr(12.75)Glu(96)/Ca(9.3)/Mg(2.2)/PO4(12.1)     @ 00:50  Na(138)/K(5.8)/Cl(92)/HCO3(17)/BUN(102)/Cr(11.76)Glu(100)/Ca(9.6)/Mg(--)/PO4(--)     @ 13:00        134<L>  |  91<L>  |  75<H>  ----------------------------<  90  4.4   |  22  |  9.09<H>    Ca    8.6      2020 06:00  Phos  9.0       Mg     2.2         TPro  7.6  /  Alb  4.5  /  TBili  0.2  /  DBili  x   /  AST  19  /  ALT  22  /  AlkPhos  55      Urinalysis Basic - ( 2020 08:12 )    Color: LIGHT YELLOW / Appearance: CLEAR / S.013 / pH: 6.5  Gluc: NEGATIVE / Ketone: NEGATIVE  / Bili: NEGATIVE / Urobili: NORMAL   Blood: NEGATIVE / Protein: 300 / Nitrite: NEGATIVE   Leuk Esterase: NEGATIVE / RBC: 3-5 / WBC 3-5   Sq Epi: FEW / Non Sq Epi: x / Bacteria: NEGATIVE
Patient is a 34y old  Male who presents with a chief complaint of Cough and Anemia (01 Mar 2020 19:27)      INTERVAL HISTORY: feels ok    	  MEDICATIONS:  amLODIPine   Tablet 10 milliGRAM(s) Oral daily  cloNIDine 0.3 milliGRAM(s) Oral three times a day  hydrALAZINE 100 milliGRAM(s) Oral three times a day  labetalol 600 milliGRAM(s) Oral three times a day  losartan 100 milliGRAM(s) Oral at bedtime  spironolactone 100 milliGRAM(s) Oral every 12 hours        PHYSICAL EXAM:  T(C): 37.1 (03-01-20 @ 21:23), Max: 37.1 (03-01-20 @ 01:01)  HR: 84 (03-01-20 @ 21:23) (69 - 84)  BP: 128/88 (03-01-20 @ 21:23) (127/85 - 177/113)  RR: 17 (03-01-20 @ 21:23) (17 - 18)  SpO2: 98% (03-01-20 @ 21:23) (98% - 100%)  Wt(kg): --  I&O's Summary    29 Feb 2020 07:01  -  01 Mar 2020 07:00  --------------------------------------------------------  IN: 1950 mL / OUT: 3300 mL / NET: -1350 mL    01 Mar 2020 07:01  -  01 Mar 2020 23:12  --------------------------------------------------------  IN: 1000 mL / OUT: 300 mL / NET: 700 mL          Appearance: In no distress	  HEENT:    PERRL, EOMI	  Cardiovascular:  S1 S2, No JVD  Respiratory: Lungs clear to auscultation	  Gastrointestinal:  Soft, Non-tender, + BS	  Extremities:  No edema of LE                                10.7   3.10  )-----------( 210      ( 01 Mar 2020 06:45 )             31.5     03-01    133<L>  |  91<L>  |  32<H>  ----------------------------<  93  3.5   |  24  |  5.77<H>    Ca    9.5      01 Mar 2020 06:45  Phos  4.9     03-01  Mg     2.2     03-01          Labs personally reviewed      ASSESSMENT/PLAN: 	  pt on multiple max dose anti-hypertensives at a young age with still poorly controlled HTN  d/w renal Dr Castro, unclear if he has had work up for secondary HTN in past   CT a/p with contrast ruled out, EKTA, adrenal hyperplasia   TTE pending, known low EF per pt with EF of 30%        Alfredo Guevara DO [facility]  Cardiovascular Medicine  [address] [place] [address], [address]  Office: [phone]  Cell: [phone]
Patient is a 34y old  Male who presents with a chief complaint of Cough and Anemia (28 Feb 2020 16:19)      INTERVAL HISTORY: feels ok  	  MEDICATIONS:  amLODIPine   Tablet 10 milliGRAM(s) Oral daily  cloNIDine 0.3 milliGRAM(s) Oral three times a day  hydrALAZINE 100 milliGRAM(s) Oral three times a day  labetalol 600 milliGRAM(s) Oral three times a day  losartan 100 milliGRAM(s) Oral at bedtime  spironolactone 100 milliGRAM(s) Oral every 12 hours        PHYSICAL EXAM:  T(C): 36.9 (02-28-20 @ 21:41), Max: 37.3 (02-28-20 @ 09:00)  HR: 74 (02-28-20 @ 21:41) (74 - 83)  BP: 140/98 (02-28-20 @ 21:41) (136/95 - 154/110)  RR: 18 (02-28-20 @ 21:41) (16 - 19)  SpO2: 100% (02-28-20 @ 21:41) (98% - 100%)  Wt(kg): --  I&O's Summary    27 Feb 2020 07:01  -  28 Feb 2020 07:00  --------------------------------------------------------  IN: 1050 mL / OUT: 3000 mL / NET: -1950 mL    28 Feb 2020 07:01  -  28 Feb 2020 22:23  --------------------------------------------------------  IN: 1950 mL / OUT: 3550 mL / NET: -1600 mL          Appearance: In no distress	  HEENT:    PERRL, EOMI	  Cardiovascular:  S1 S2, No JVD  Respiratory: Lungs clear to auscultation	  Gastrointestinal:  Soft, Non-tender, + BS	  Extremities:  No edema of LE                                7.9    3.04  )-----------( 178      ( 28 Feb 2020 06:00 )             24.1     02-28    134<L>  |  91<L>  |  75<H>  ----------------------------<  90  4.4   |  22  |  9.09<H>    Ca    8.6      28 Feb 2020 06:00  Phos  9.0     02-28  Mg     2.2     02-28    TPro  7.6  /  Alb  4.5  /  TBili  0.2  /  DBili  x   /  AST  19  /  ALT  22  /  AlkPhos  55  02-27        Labs personally reviewed      ASSESSMENT/PLAN: 	  pt on multiple max dose anti-hypertensives at a young age with still poorly controlled HTN  d/w renal Dr Castro, unclear if he has had work up for secondary HTN in past  Will do CT c/a/p with contrast to r/o coarctation, EKTA, adrenal hyperplasia         Alfredo Guevara DO [facility]  Cardiovascular Medicine  [address] [place] [address], [address]  Office: [phone]  Cell: [phone]
Patient is a 34y old  Male who presents with a chief complaint of Cough and Anemia (29 Feb 2020 19:17)      INTERVAL HISTORY: feels ok    MEDICATIONS:  amLODIPine   Tablet 10 milliGRAM(s) Oral daily  cloNIDine 0.3 milliGRAM(s) Oral three times a day  hydrALAZINE 100 milliGRAM(s) Oral three times a day  labetalol 600 milliGRAM(s) Oral three times a day  losartan 100 milliGRAM(s) Oral at bedtime  spironolactone 100 milliGRAM(s) Oral every 12 hours        PHYSICAL EXAM:  T(C): 37 (02-29-20 @ 21:52), Max: 37 (02-29-20 @ 21:52)  HR: 73 (02-29-20 @ 21:52) (73 - 79)  BP: 178/111 (02-29-20 @ 21:52) (131/71 - 178/111)  RR: 20 (02-29-20 @ 21:52) (18 - 20)  SpO2: 100% (02-29-20 @ 19:55) (100% - 100%)  Wt(kg): --  I&O's Summary    28 Feb 2020 07:01  -  29 Feb 2020 07:00  --------------------------------------------------------  IN: 2200 mL / OUT: 4050 mL / NET: -1850 mL    29 Feb 2020 07:01  -  01 Mar 2020 00:18  --------------------------------------------------------  IN: 1200 mL / OUT: 750 mL / NET: 450 mL          Appearance: In no distress	  HEENT:    PERRL, EOMI	  Cardiovascular:  S1 S2, No JVD  Respiratory: Lungs clear to auscultation	  Gastrointestinal:  Soft, Non-tender, + BS	  Extremities:  No edema of LE                                10.3   2.88  )-----------( 198      ( 29 Feb 2020 07:30 )             30.9     02-29    132<L>  |  88<L>  |  59<H>  ----------------------------<  86  4.2   |  23  |  8.32<H>    Ca    9.0      29 Feb 2020 07:30  Phos  8.2     02-29  Mg     2.2     02-29          Labs personally reviewed      ASSESSMENT/PLAN: 	  pt on multiple max dose anti-hypertensives at a young age with still poorly controlled HTN  d/w renal Dr Castro, unclear if he has had work up for secondary HTN in past   CT a/p with contrast ruled out, EKTA, adrenal hyperplasia   CTA chest to r/o corac  TTE pending        Alfredo Guevara DO [facility]  Cardiovascular Medicine  [address] [place] [address], [address]  Office: [phone]  Cell: [phone]
Subjective: Patient seen and examined. No new events except as noted.   doing well   D/C Planing     REVIEW OF SYSTEMS:    CONSTITUTIONAL: No weakness, fevers or chills  EYES/ENT: No visual changes;  No vertigo or throat pain   NECK: No pain or stiffness  RESPIRATORY: No cough, wheezing, hemoptysis; No shortness of breath  CARDIOVASCULAR: No chest pain or palpitations  GASTROINTESTINAL: No abdominal or epigastric pain. No nausea, vomiting,  GENITOURINARY: No dysuria, frequency or hematuria  NEUROLOGICAL: No numbness or weakness  SKIN: No itching, burning, rashes, or lesions   All other review of systems is negative unless indicated above.    MEDICATIONS:  MEDICATIONS  (STANDING):  amLODIPine   Tablet 10 milliGRAM(s) Oral daily  chlorhexidine 4% Liquid 1 Application(s) Topical daily  cloNIDine 0.3 milliGRAM(s) Oral three times a day  efavirenz Tablet 600 milliGRAM(s) Oral every 24 hours  heparin  Injectable 5000 Unit(s) SubCutaneous every 8 hours  hydrALAZINE 100 milliGRAM(s) Oral three times a day  labetalol 600 milliGRAM(s) Oral three times a day  lamiVUDine Solution 75 milliGRAM(s) Oral daily  losartan 100 milliGRAM(s) Oral at bedtime  multivitamin 1 Tablet(s) Oral daily  sevelamer carbonate 2400 milliGRAM(s) Oral three times a day with meals  spironolactone 100 milliGRAM(s) Oral every 12 hours  tenofovir disoproxil fumarate (VIREAD) 300 milliGRAM(s) Oral <User Schedule>      PHYSICAL EXAM:  T(C): 36.9 (03-01-20 @ 17:10), Max: 37.1 (03-01-20 @ 01:01)  HR: 75 (03-01-20 @ 17:10) (69 - 75)  BP: 134/98 (03-01-20 @ 17:10) (127/85 - 178/111)  RR: 18 (03-01-20 @ 17:10) (18 - 20)  SpO2: 100% (03-01-20 @ 17:10) (100% - 100%)  Wt(kg): --  I&O's Summary    29 Feb 2020 07:01  -  01 Mar 2020 07:00  --------------------------------------------------------  IN: 1950 mL / OUT: 3300 mL / NET: -1350 mL    01 Mar 2020 07:01  -  01 Mar 2020 19:27  --------------------------------------------------------  IN: 1000 mL / OUT: 300 mL / NET: 700 mL          Appearance: Normal	  HEENT:   Normal oral mucosa, PERRL, EOMI	  Lymphatic: No lymphadenopathy , no edema  Cardiovascular: Normal S1 S2, No JVD  Respiratory: Lungs clear to auscultation, normal effort 	  Gastrointestinal:  Soft, Non-tender, + BS	  Skin: No rashes, No ecchymoses, No cyanosis, warm to touch  Musculoskeletal: Normal range of motion, normal strength  Psychiatry:  Mood & affect appropriate  Ext: No edema      All labs, Imaging and EKGs personally reviewed                           10.7   3.10  )-----------( 210      ( 01 Mar 2020 06:45 )             31.5               03-01    133<L>  |  91<L>  |  32<H>  ----------------------------<  93  3.5   |  24  |  5.77<H>    Ca    9.5      01 Mar 2020 06:45  Phos  4.9     03-01  Mg     2.2     03-01
Subjective: Patient seen and examined. No new events except as noted.   feeling good     REVIEW OF SYSTEMS:    CONSTITUTIONAL: No weakness, fevers or chills  EYES/ENT: No visual changes;  No vertigo or throat pain   NECK: No pain or stiffness  RESPIRATORY: No cough, wheezing, hemoptysis; No shortness of breath  CARDIOVASCULAR: No chest pain or palpitations  GASTROINTESTINAL: No abdominal or epigastric pain. No nausea, vomiting, or hematemesis; No diarrhea or constipation. No melena or hematochezia.  GENITOURINARY: No dysuria, frequency or hematuria  NEUROLOGICAL: No numbness or weakness  SKIN: No itching, burning, rashes, or lesions   All other review of systems is negative unless indicated above.    MEDICATIONS:  MEDICATIONS  (STANDING):  amLODIPine   Tablet 10 milliGRAM(s) Oral daily  chlorhexidine 4% Liquid 1 Application(s) Topical daily  cloNIDine 0.3 milliGRAM(s) Oral three times a day  efavirenz Tablet 600 milliGRAM(s) Oral every 24 hours  heparin  Injectable 5000 Unit(s) SubCutaneous every 8 hours  hydrALAZINE 100 milliGRAM(s) Oral three times a day  labetalol 600 milliGRAM(s) Oral three times a day  lamiVUDine Solution 75 milliGRAM(s) Oral daily  losartan 100 milliGRAM(s) Oral at bedtime  multivitamin 1 Tablet(s) Oral daily  sevelamer carbonate 2400 milliGRAM(s) Oral three times a day with meals  spironolactone 100 milliGRAM(s) Oral every 12 hours  tenofovir disoproxil fumarate (VIREAD) 300 milliGRAM(s) Oral <User Schedule>      PHYSICAL EXAM:  T(C): 36.9 (02-29-20 @ 11:55), Max: 36.9 (02-28-20 @ 21:41)  HR: 73 (02-29-20 @ 11:55) (73 - 79)  BP: 131/71 (02-29-20 @ 11:55) (131/71 - 144/96)  RR: 18 (02-29-20 @ 11:55) (18 - 18)  SpO2: 100% (02-29-20 @ 11:55) (100% - 100%)  Wt(kg): --  I&O's Summary    28 Feb 2020 07:01  -  29 Feb 2020 07:00  --------------------------------------------------------  IN: 2200 mL / OUT: 4050 mL / NET: -1850 mL    29 Feb 2020 07:01  -  29 Feb 2020 19:21  --------------------------------------------------------  IN: 1200 mL / OUT: 750 mL / NET: 450 mL          Appearance: Normal	  HEENT:   Normal oral mucosa, PERRL, EOMI	  Lymphatic: No lymphadenopathy , no edema  Cardiovascular: Normal S1 S2, No JVD, No murmurs , Peripheral pulses palpable 2+ bilaterally  Respiratory: Lungs clear to auscultation, normal effort 	  Gastrointestinal:  Soft, Non-tender, + BS	  Skin: No rashes, No ecchymoses, No cyanosis, warm to touch  Musculoskeletal: Normal range of motion, normal strength  Psychiatry:  Mood & affect appropriate  Ext: No edema      All labs, Imaging and EKGs personally reviewed                           10.3   2.88  )-----------( 198      ( 29 Feb 2020 07:30 )             30.9               02-29    132<L>  |  88<L>  |  59<H>  ----------------------------<  86  4.2   |  23  |  8.32<H>    Ca    9.0      29 Feb 2020 07:30  Phos  8.2     02-29  Mg     2.2     02-29             < from: CT Angio Abdomen and Pelvis w/ IV Cont (02.29.20 @ 14:09) >  IMPRESSION:     No CT evidence of renal artery stenosis.    Marked cardiomegaly.    Innumerable bilateral small renal cysts.
Subjective: Patient seen and examined. No new events except as noted.   feeling good  would like to go home  echo done today     REVIEW OF SYSTEMS:    CONSTITUTIONAL: No weakness, fevers or chills  EYES/ENT: No visual changes;  No vertigo or throat pain   NECK: No pain or stiffness  RESPIRATORY: No cough, wheezing, hemoptysis; No shortness of breath  CARDIOVASCULAR: No chest pain or palpitations  GASTROINTESTINAL: No abdominal or epigastric pain. No nausea, vomiting, or hematemesis; No diarrhea or constipation. No melena or hematochezia.  GENITOURINARY: No dysuria, frequency or hematuria  NEUROLOGICAL: No numbness or weakness  SKIN: No itching, burning, rashes, or lesions   All other review of systems is negative unless indicated above.    MEDICATIONS:  MEDICATIONS  (STANDING):  amLODIPine   Tablet 10 milliGRAM(s) Oral daily  chlorhexidine 4% Liquid 1 Application(s) Topical daily  cloNIDine 0.3 milliGRAM(s) Oral three times a day  efavirenz Tablet 600 milliGRAM(s) Oral every 24 hours  heparin  Injectable 5000 Unit(s) SubCutaneous every 8 hours  hydrALAZINE 100 milliGRAM(s) Oral three times a day  labetalol 600 milliGRAM(s) Oral three times a day  lamiVUDine Solution 75 milliGRAM(s) Oral daily  losartan 100 milliGRAM(s) Oral at bedtime  multivitamin 1 Tablet(s) Oral daily  sevelamer carbonate 2400 milliGRAM(s) Oral three times a day with meals  tenofovir disoproxil fumarate (VIREAD) 300 milliGRAM(s) Oral <User Schedule>      PHYSICAL EXAM:  T(C): 36.7 (03-02-20 @ 13:44), Max: 37.1 (03-01-20 @ 21:23)  HR: 79 (03-02-20 @ 13:44) (73 - 85)  BP: 138/103 (03-02-20 @ 13:44) (128/88 - 151/107)  RR: 18 (03-02-20 @ 13:44) (16 - 18)  SpO2: 99% (03-02-20 @ 13:44) (98% - 100%)  Wt(kg): --  I&O's Summary    01 Mar 2020 07:01  -  02 Mar 2020 07:00  --------------------------------------------------------  IN: 1300 mL / OUT: 850 mL / NET: 450 mL          Appearance: Normal	  HEENT:   Normal oral mucosa, PERRL, EOMI	  Lymphatic: No lymphadenopathy , no edema  Cardiovascular: Normal S1 S2, No JVD, No murmurs , Peripheral pulses palpable 2+ bilaterally  Respiratory: Lungs clear to auscultation, normal effort 	  Gastrointestinal:  Soft, Non-tender, + BS	  Skin: No rashes, No ecchymoses, No cyanosis, warm to touch  Musculoskeletal: Normal range of motion, normal strength  Psychiatry:  Mood & affect appropriate  Ext: No edema      All labs, Imaging and EKGs personally reviewed                           10.7   3.10  )-----------( 210      ( 01 Mar 2020 06:45 )             31.5               03-01    133<L>  |  91<L>  |  32<H>  ----------------------------<  93  3.5   |  24  |  5.77<H>    Ca    9.5      01 Mar 2020 06:45  Phos  4.9     03-01  Mg     2.2     03-01         < from: Transthoracic Echocardiogram (03.02.20 @ 09:28) >  CONCLUSIONS:  1. Normal mitral valve. Mild mitral regurgitation.  2. Moderate concentric left ventricular hypertrophy.  3. Mild global left ventricular systolic dysfunction.  4. Normal right ventricular size and function.
Subjective: Patient seen and examined. No new events except as noted.   feeling much better  BP controlled  breathing well   D/C O2 supplement     REVIEW OF SYSTEMS:    CONSTITUTIONAL: No weakness, fevers or chills  EYES/ENT: No visual changes;  No vertigo or throat pain   NECK: No pain or stiffness  RESPIRATORY: No cough, wheezing, hemoptysis; No shortness of breath  CARDIOVASCULAR: No chest pain or palpitations  GASTROINTESTINAL: No abdominal or epigastric pain. No nausea, vomiting, or hematemesis; No diarrhea or constipation. No melena or hematochezia.  GENITOURINARY: No dysuria, frequency or hematuria  NEUROLOGICAL: No numbness or weakness  SKIN: No itching, burning, rashes, or lesions   All other review of systems is negative unless indicated above.    MEDICATIONS:  MEDICATIONS  (STANDING):  amLODIPine   Tablet 10 milliGRAM(s) Oral daily  chlorhexidine 4% Liquid 1 Application(s) Topical daily  cloNIDine 0.3 milliGRAM(s) Oral three times a day  efavirenz Tablet 600 milliGRAM(s) Oral every 24 hours  hydrALAZINE 100 milliGRAM(s) Oral three times a day  labetalol 600 milliGRAM(s) Oral three times a day  lamiVUDine Solution 75 milliGRAM(s) Oral daily  losartan 100 milliGRAM(s) Oral at bedtime  multivitamin 1 Tablet(s) Oral daily  sevelamer carbonate 800 milliGRAM(s) Oral three times a day  spironolactone 100 milliGRAM(s) Oral every 12 hours  tenofovir disoproxil fumarate (VIREAD) 300 milliGRAM(s) Oral <User Schedule>      PHYSICAL EXAM:  T(C): 37.1 (20 @ 15:08), Max: 37.8 (20 @ 22:13)  HR: 74 (20 @ 15:08) (73 - 87)  BP: 139/94 (20 @ 15:08) (139/94 - 163/105)  RR: 18 (20 @ 15:08) (16 - 18)  SpO2: 98% (20 @ 15:08) (92% - 100%)  Wt(kg): --  I&O's Summary    2020 07:01  -  2020 07:00  --------------------------------------------------------  IN: 1050 mL / OUT: 3000 mL / NET: -1950 mL    2020 07:01  -  2020 15:21  --------------------------------------------------------  IN: 700 mL / OUT: 2900 mL / NET: -2200 mL      Height (cm): 172.7 ( @ 16:38)    Appearance: Normal	  HEENT:   Normal oral mucosa, PERRL, EOMI	  Lymphatic: No lymphadenopathy , no edema  Cardiovascular: Normal S1 S2  Respiratory: Lungs clear to auscultation, normal effort 	  Gastrointestinal:  Soft, Non-tender, + BS	  Skin: No rashes, No ecchymoses, No cyanosis, warm to touch  Musculoskeletal: Normal range of motion, normal strength  Psychiatry:  Mood & affect appropriate  Ext: No edema      All labs, Imaging and EKGs personally reviewed                           7.9    3.04  )-----------( 178      ( 2020 06:00 )             24.1                   134<L>  |  91<L>  |  75<H>  ----------------------------<  90  4.4   |  22  |  9.09<H>    Ca    8.6      2020 06:00  Phos  9.0       Mg     2.2         TPro  7.6  /  Alb  4.5  /  TBili  0.2  /  DBili  x   /  AST  19  /  ALT  22  /  AlkPhos  55                         Urinalysis Basic - ( 2020 08:12 )    Color: LIGHT YELLOW / Appearance: CLEAR / S.013 / pH: 6.5  Gluc: NEGATIVE / Ketone: NEGATIVE  / Bili: NEGATIVE / Urobili: NORMAL   Blood: NEGATIVE / Protein: 300 / Nitrite: NEGATIVE   Leuk Esterase: NEGATIVE / RBC: 3-5 / WBC 3-5   Sq Epi: FEW / Non Sq Epi: x / Bacteria: NEGATIVE    < from: CT Chest No Cont (20 @ 23:14) >  IMPRESSION: Nodular opacities throughout the right lower lobe representing pneumonia with endobronchial spread. A 1 to 3 month follow-up chest CT is recommended to ensure resolution.    Cardiomegaly as described above.
Subjective: Patient seen and examined. No new events except as noted.   feeling much better today   Bp not controlled     REVIEW OF SYSTEMS:    CONSTITUTIONAL: No weakness, fevers or chills  EYES/ENT: No visual changes;  No vertigo or throat pain   NECK: No pain or stiffness  RESPIRATORY: No cough, wheezing, hemoptysis; No shortness of breath  CARDIOVASCULAR: No chest pain or palpitations  GASTROINTESTINAL: No abdominal or epigastric pain. No nausea, vomiting, or hematemesis; No diarrhea or constipation. No melena or hematochezia.  GENITOURINARY: No dysuria, frequency or hematuria  NEUROLOGICAL: No numbness or weakness  SKIN: No itching, burning, rashes, or lesions   All other review of systems is negative unless indicated above.    MEDICATIONS:  MEDICATIONS  (STANDING):  amLODIPine   Tablet 10 milliGRAM(s) Oral daily  cloNIDine 0.3 milliGRAM(s) Oral three times a day  efavirenz Tablet 600 milliGRAM(s) Oral every 24 hours  hydrALAZINE 100 milliGRAM(s) Oral three times a day  labetalol 600 milliGRAM(s) Oral four times a day  lamiVUDine Solution 75 milliGRAM(s) Oral daily  losartan 100 milliGRAM(s) Oral at bedtime  multivitamin 1 Tablet(s) Oral daily  oseltamivir 30 milliGRAM(s) Oral once  sevelamer carbonate 800 milliGRAM(s) Oral three times a day  spironolactone 100 milliGRAM(s) Oral every 12 hours  tenofovir disoproxil fumarate (VIREAD) 300 milliGRAM(s) Oral <User Schedule>      PHYSICAL EXAM:  T(C): 37.1 (20 @ 16:38), Max: 37.8 (20 @ 14:35)  HR: 73 (20 @ 16:38) (73 - 97)  BP: 150/103 (20 @ 16:38) (150/103 - 184/135)  RR: 16 (20 @ 16:38) (16 - 19)  SpO2: 100% (20 @ 16:38) (89% - 100%)  Wt(kg): --  I&O's Summary    2020 07:01  -  2020 17:04  --------------------------------------------------------  IN: 400 mL / OUT: 3000 mL / NET: -2600 mL          Appearance: Normal	  HEENT:   Normal oral mucosa, PERRL, EOMI	  Lymphatic: No lymphadenopathy , no edema  Cardiovascular: Normal S1 S2  Respiratory: Lungs clear to auscultation, normal effort 	  Gastrointestinal:  Soft, Non-tender, + BS	  Skin: No rashes, No ecchymoses, No cyanosis, warm to touch  Musculoskeletal: Normal range of motion, normal strength  Psychiatry:  Mood & affect appropriate  Ext: No edema      All labs, Imaging and EKGs personally reviewed                           7.5    3.93  )-----------( 152      ( 2020 06:00 )             23.4                   133<L>  |  89<L>  |  118<H>  ----------------------------<  84  6.0<H>   |  15<L>  |  12.67<H>    Ca    9.0      2020 06:40  Phos  12.1       Mg     2.2         TPro  7.6  /  Alb  4.5  /  TBili  0.2  /  DBili  x   /  AST  19  /  ALT  22  /  AlkPhos  55      PT/INR - ( 2020 13:00 )   PT: 13.3 SEC;   INR: 1.16          PTT - ( 2020 13:00 )  PTT:39.2 SEC                   Urinalysis Basic - ( 2020 08:12 )    Color: LIGHT YELLOW / Appearance: CLEAR / S.013 / pH: 6.5  Gluc: NEGATIVE / Ketone: NEGATIVE  / Bili: NEGATIVE / Urobili: NORMAL   Blood: NEGATIVE / Protein: 300 / Nitrite: NEGATIVE   Leuk Esterase: NEGATIVE / RBC: 3-5 / WBC 3-5   Sq Epi: FEW / Non Sq Epi: x / Bacteria: NEGATIVE    < from: CT Chest No Cont (20 @ 23:14) >  IMPRESSION: Nodular opacities throughout the right lower lobe representing pneumonia with endobronchial spread. A 1 to 3 month follow-up chest CT is recommended to ensure resolution.
· Subjective and Objective: 	    No pain, no shortness of breath. He wants to go home.     Review of systems: All 10 points ROS was obtained except as above.     aluminum hydroxide/magnesium hydroxide/simethicone Suspension 30 milliLiter(s) Oral every 4 hours PRN  amLODIPine   Tablet 10 milliGRAM(s) Oral daily  chlorhexidine 4% Liquid 1 Application(s) Topical daily  cloNIDine 0.3 milliGRAM(s) Oral three times a day  efavirenz Tablet 600 milliGRAM(s) Oral every 24 hours  heparin  Injectable 5000 Unit(s) SubCutaneous every 8 hours  hydrALAZINE 100 milliGRAM(s) Oral three times a day  labetalol 600 milliGRAM(s) Oral three times a day  lamiVUDine Solution 75 milliGRAM(s) Oral daily  losartan 100 milliGRAM(s) Oral at bedtime  multivitamin 1 Tablet(s) Oral daily  sevelamer carbonate 2400 milliGRAM(s) Oral three times a day with meals  tenofovir disoproxil fumarate (VIREAD) 300 milliGRAM(s) Oral <User Schedule>      VITAL:  T(C): , Max: 37.3 (03-02-20 @ 16:20)  T(F): , Max: 99.1 (03-02-20 @ 16:20)  HR: 80 (03-02-20 @ 16:20)  BP: 163/115 (03-02-20 @ 16:20)  BP(mean): 3 (03-02-20 @ 12:21)  RR: 18 (03-02-20 @ 16:20)  SpO2: 99% (03-02-20 @ 16:20)      03-01-20 @ 07:01  -  03-02-20 @ 07:00  --------------------------------------------------------  IN: 1300 mL / OUT: 850 mL / NET: 450 mL        PHYSICAL EXAM:  General: NAD; Alert  HEENT:  NCAT; PERRLA  Neck: No JVD; supple  Respiratory: CTA-b/l  Cardiac: RRR s1s2  Gastrointestinal: BS+, soft, NT/ND  Urologic: No tapia  Extremities: No peripheral edema  Access: Left AVF with positive thrill and bruit.     LABS:                          10.7   3.10  )-----------( 210      ( 01 Mar 2020 06:45 )             31.5     Na(133)/K(3.5)/Cl(91)/HCO3(24)/BUN(32)/Cr(5.77)Glu(93)/Ca(9.5)/Mg(2.2)/PO4(4.9)    03-01 @ 06:45  Na(132)/K(4.2)/Cl(88)/HCO3(23)/BUN(59)/Cr(8.32)Glu(86)/Ca(9.0)/Mg(2.2)/PO4(8.2)    02-29 @ 07:30        03-01    133<L>  |  91<L>  |  32<H>  ----------------------------<  93  3.5   |  24  |  5.77<H>    Ca    9.5      01 Mar 2020 06:45  Phos  4.9     03-01  Mg     2.2     03-01

## 2020-03-02 NOTE — PROGRESS NOTE ADULT - REASON FOR ADMISSION
Cough and Anemia

## 2020-03-17 PROBLEM — A53.9 SYPHILIS: Status: ACTIVE | Noted: 2019-08-21

## 2020-03-17 NOTE — ASSESSMENT
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 Early latent, reinfection. \par            IM PCN 2.4 MU x 1 given in early September, 2019\par \par plan check T cells, HIV viral load, repeat RPR

## 2020-03-17 NOTE — PHYSICAL EXAM
[General Appearance - Alert] : alert [General Appearance - In No Acute Distress] : in no acute distress [Sclera] : the sclera and conjunctiva were normal [PERRL With Normal Accommodation] : pupils were equal in size, round, reactive to light [Extraocular Movements] : extraocular movements were intact [Outer Ear] : the ears and nose were normal in appearance [Oropharynx] : the oropharynx was normal with no thrush [Neck Appearance] : the appearance of the neck was normal [Neck Cervical Mass (___cm)] : no neck mass was observed [Jugular Venous Distention Increased] : there was no jugular-venous distention [Thyroid Diffuse Enlargement] : the thyroid was not enlarged [Auscultation Breath Sounds / Voice Sounds] : lungs were clear to auscultation bilaterally [Heart Rate And Rhythm] : heart rate was normal and rhythm regular [Heart Sounds] : normal S1 and S2 [Heart Sounds Gallop] : no gallops [Murmurs] : no murmurs [Heart Sounds Pericardial Friction Rub] : no pericardial rub [Edema] : there was no peripheral edema [Bowel Sounds] : normal bowel sounds [Abdomen Soft] : soft [Abdomen Tenderness] : non-tender [Abdomen Mass (___ Cm)] : no abdominal mass palpated [Costovertebral Angle Tenderness] : no CVA tenderness [No Palpable Adenopathy] : no palpable adenopathy [Musculoskeletal - Swelling] : no joint swelling [Nail Clubbing] : no clubbing  or cyanosis of the fingernails [Motor Tone] : muscle strength and tone were normal [Skin Color & Pigmentation] : normal skin color and pigmentation [] : no rash [No Focal Deficits] : no focal deficits [Oriented To Time, Place, And Person] : oriented to person, place, and time [Affect] : the affect was normal [FreeTextEntry1] : thin

## 2020-03-17 NOTE — CONSULT LETTER
[Dear  ___] : Dear  [unfilled], [Consult Letter:] : I had the pleasure of evaluating your patient, [unfilled]. [Please see my note below.] : Please see my note below. [Consult Closing:] : Thank you very much for allowing me to participate in the care of this patient.  If you have any questions, please do not hesitate to contact me. [Sincerely,] : Sincerely, [DrNatalia  ___] : Dr. GIRARD [FreeTextEntry2] : Erica Aragon MD\par [address]; [address]\[place] [address]  [FreeTextEntry3] : Gonsalo Joaquin MD, FACP ARMIN AAHIVM\par Infectious Diseases\par [facility]

## 2020-03-17 NOTE — HISTORY OF PRESENT ILLNESS
[FreeTextEntry1] : Doing well.\par He was hospitalized at [facility] 2/26/2020 with Influenza B, fever, fatigue, anemia. He had flu shot September 2019. Chest CT on 2/26/20 demonstrated small centrilobular nodular opacities in RLL. 2/27/20: HIV Viral load Undetectable< 12 copies. He had blood transfusion. He currently feels fine.\par \par He undergoes home Hemodialysis. He produces normal amount of urine.\par \par He takes Efavirenz, Lamivudine and weekly Tenofovir (each saturday) without adverse effects. On \par Labs from 8/21/19: HIV vIral Load UNDETECTABLE, <12 jl; CD4 = 324 - 46%; +RPR 1:32. He had Bicillin 2.4 MU injection in early September 2019. He has not been sexually active since that time.\par \par He is in good spirits, works in IT sales. He is listed for kidney transplant at [facility] [Sexually Active] : The patient is not sexually active

## 2021-01-01 ENCOUNTER — NON-APPOINTMENT (OUTPATIENT)
Age: 36
End: 2021-01-01

## 2021-01-01 ENCOUNTER — RX RENEWAL (OUTPATIENT)
Age: 36
End: 2021-01-01

## 2021-02-16 ENCOUNTER — NON-APPOINTMENT (OUTPATIENT)
Age: 36
End: 2021-02-16

## 2023-11-20 NOTE — CONSULT NOTE ADULT - SUBJECTIVE AND OBJECTIVE BOX
CHIEF COMPLAINT:Patient is a 34y old  Male who presents with a chief complaint of Cough and Anemia (27 Feb 2020 20:22)      HISTORY OF PRESENT ILLNESS:HPI:  32 y/o Male with PMHx of HIV+, HTN, ESRD on home hemodialysis, secondary hyperparathyroidism presents with fatigue, Malaise, loose stools , abdominal bloating , dry cough and mild SOB since Monday. Pt was seen by Dr. Castro (Renal/PCP). Pt's lab results showed hbg of 7.3 was sent to ER for possible blood transfusion. Pt also endorse lack of appetite and low energy , unable to sleep past few days. Abdominal pain with gas pain, that believed that pain was due to gas and used Gas X with minimal relief. Pt denies chest pain, PND,, palpitations, diaphoresis, lightheadedness, dizziness, syncope, increased lowr extremity edema, fever chills,, myalgias, anorexia, weight changes ( loss or gain), nightsweats, generalized fatigue abdominal pain, constipation , BRBPR, melena, urinary symptoms, and wheezing. Pt admits that roommate has been sick past week with flu.    In the ER, pt desatted to 83% on RA , pt placed on 3L NC satting 100%. Spoke with   Pt will go to HD 5am, for now will give 80IVP Lasix and restrict fluids. (26 Feb 2020 21:26)      PAST MEDICAL & SURGICAL HISTORY:  HTN (hypertension)  No significant past surgical history          MEDICATIONS:  amLODIPine   Tablet 10 milliGRAM(s) Oral daily  cloNIDine 0.3 milliGRAM(s) Oral three times a day  hydrALAZINE 100 milliGRAM(s) Oral three times a day  labetalol 600 milliGRAM(s) Oral three times a day  losartan 100 milliGRAM(s) Oral at bedtime  spironolactone 100 milliGRAM(s) Oral every 12 hours    efavirenz Tablet 600 milliGRAM(s) Oral every 24 hours  lamiVUDine Solution 75 milliGRAM(s) Oral daily  tenofovir disoproxil fumarate (VIREAD) 300 milliGRAM(s) Oral <User Schedule>        aluminum hydroxide/magnesium hydroxide/simethicone Suspension 30 milliLiter(s) Oral every 4 hours PRN      chlorhexidine 4% Liquid 1 Application(s) Topical daily  multivitamin 1 Tablet(s) Oral daily      FAMILY HISTORY:  Family history of systemic lupus erythematosus (Aunt)      Non-contributory    SOCIAL HISTORY:    [ ] Tobacco  [ ] Drugs  [ ] Alcohol    Allergies    No Known Allergies    Intolerances    	    REVIEW OF SYSTEMS:  CONSTITUTIONAL: No fever  EYES: No eye pain, visual disturbances, or discharge  ENMT:  No difficulty hearing, tinnitus  NECK: No pain or stiffness  RESPIRATORY: No cough, wheezing,  CARDIOVASCULAR: No chest pain, palpitations, passing out, dizziness, or leg swelling  GASTROINTESTINAL:  No nausea, vomiting, diarrhea or constipation. No melena.  GENITOURINARY: No dysuria, hematuria  NEUROLOGICAL: No stroke like symptoms  SKIN: No burning or lesions   ENDOCRINE: No heat or cold intolerance  MUSCULOSKELETAL: No joint pain or swelling  PSYCHIATRIC: No  anxiety, mood swings  HEME/LYMPH: No bleeding gums  ALLERGY AND IMMUNOLOGIC: No hives or eczema	    All other ROS negative    PHYSICAL EXAM:  T(C): 37.8 (02-27-20 @ 22:13), Max: 37.8 (02-27-20 @ 14:35)  HR: 79 (02-27-20 @ 22:13) (73 - 93)  BP: 163/105 (02-27-20 @ 22:13) (150/103 - 180/113)  RR: 16 (02-27-20 @ 22:13) (16 - 18)  SpO2: 100% (02-27-20 @ 22:13) (92% - 100%)  Wt(kg): --  I&O's Summary    27 Feb 2020 07:01  -  27 Feb 2020 22:37  --------------------------------------------------------  IN: 700 mL / OUT: 3000 mL / NET: -2300 mL        Appearance: Normal	  HEENT:   Normal oral mucosa, EOMI	  Cardiovascular: Normal S1 S2, No JVD, No murmurs  Respiratory: Lungs clear to auscultation	  Psychiatry: Alert  Gastrointestinal:  Soft, Non-tender, + BS	  Skin: No rashes   Neurologic: Non-focal  Extremities:  No edema  Vascular: Peripheral pulses palpable    	    	  	  CARDIAC MARKERS:  Labs personally reviewed by me                                  7.5    3.93  )-----------( 152      ( 27 Feb 2020 06:00 )             23.4     02-27    133<L>  |  89<L>  |  118<H>  ----------------------------<  84  6.0<H>   |  15<L>  |  12.67<H>    Ca    9.0      27 Feb 2020 06:40  Phos  12.1     02-27  Mg     2.2     02-27    TPro  7.6  /  Alb  4.5  /  TBili  0.2  /  DBili  x   /  AST  19  /  ALT  22  /  AlkPhos  55  02-27          EKG: Personally reviewed by me - NSR LVH lateral wall TWI      Assessment /Plan:   Reports hx of sHF with EF of 30% - states had angiogram done as part of pre kidney transplant work up that was normal  Will repeat TTE here  His aggressive HTN is being managed by his nephrologist Dr Castro    Advanced care planning/advanced directives were discussed with patient/family. Goals of care was discussed with patient/family.  Differential diagnosis and plan of care discussed with patient after the evaluation.  Counseling on diet, nutritional counseling, weight management, exercise and medication compliance was done.        Alfredo Guevara DO [facility]  Cardiovascular Medicine  [address] [place] [address] Dr, [address]  Office [phone]  Cell [phone]
HPI:  32 y/o Male with PMHx of  HIV+-- dx in  when presented with pericarditis  HTN  pericarditis  ESRD on home hemodialysis since  secondary hyperparathyroidism     He presents with cough that began on Monday.  He notes associated nasal and chest congestion.  He denies fever.    He feel s sob. Worse with extertion (especially stairs/ better with rest).    + Sick contact--roomate has cough.      Notes generalized malaise    He has some loose stools with abdominal bloating      In the ER, pt had decrease oxygen saturation to 83% on RA , pt placed on 3L NC satting 100%.     He feels better today.         PAST MEDICAL & SURGICAL HISTORY:  HTN (hypertension)  No significant past surgical history      Allergies    No Known Allergies    Intolerances        ANTIMICROBIALS:  oseltamivir 30 once      OTHER MEDS:  aluminum hydroxide/magnesium hydroxide/simethicone Suspension 30 milliLiter(s) Oral every 4 hours PRN  amLODIPine   Tablet 10 milliGRAM(s) Oral daily  cloNIDine 0.3 milliGRAM(s) Oral three times a day  hydrALAZINE 100 milliGRAM(s) Oral three times a day  labetalol 600 milliGRAM(s) Oral four times a day  losartan 100 milliGRAM(s) Oral at bedtime  multivitamin 1 Tablet(s) Oral daily  sevelamer carbonate 800 milliGRAM(s) Oral three times a day  spironolactone 100 milliGRAM(s) Oral every 12 hours      SOCIAL HISTORY:  Single   sexually active with men/ women in past  No IDU  Works in [employer] industry    FAMILY HISTORY:  Family history of systemic lupus erythematosus (Aunt)      REVIEW OF SYSTEMS  [  ] ROS unobtainable because:    [x  ] All other systems negative except as noted below:	    Constitutional:  [ ] fever [ ] chills  [ ] weight loss  [x ] weakness  Skin:  [ ] rash [ ] phlebitis	  Eyes: [ ] icterus [ ] pain  [ ] discharge	  ENMT: [x ] sore throat  [ ] thrush [ ] ulcers [ ] exudates  Respiratory: [x ] dyspnea [ ] hemoptysis [x ] cough [ ] sputum	  Cardiovascular:  [ ] chest pain [ ] palpitations [ ] edema	  Gastrointestinal:  [ ] nausea [ ] vomiting [ ] diarrhea [ ] constipation [ ] pain	  Genitourinary:  [ ] dysuria [ ] frequency [ ] hematuria [ ] discharge [ ] flank pain  [ ] incontinence  Musculoskeletal:  [ ] myalgias [ ] arthralgias [ ] arthritis  [ ] back pain  Neurological:  [ ] headache [ ] seizures  [ ] confusion/altered mental status  Psychiatric:  [ ] anxiety [ ] depression	  Hematology/Lymphatics:  [ ] lymphadenopathy  Endocrine:  [ ] adrenal [ ] thyroid  Allergic/Immunologic:	 [ ] transplant [ ] seasonal    PHYSICAL EXAM:  General: [x ] non-toxic  HEAD/EYES: [ ] PERRL [x ] white sclera [ ] icterus  ENT:  [ ] normal x[ ] supple [ ] thrush [ ] pharyngeal exudate  Cardiovascular:   [ ] murmur [ x] normal [ ] PPM/AICD  Respiratory:  [ x] clear to ausculation bilaterally  GI:  [ x] soft, non-tender, normal bowel sounds  :  [ ] tapia x[ ] no CVA tenderness   Musculoskeletal:  [x ] no synovitis  Neurologic:  [ x] non-focal exam   Skin:  [ x] no rash  Lymph: [x ] no lymphadenopathy  Psychiatric:  [ ] appropriate affect [x ] alert & oriented  Lines:  [x ] no phlebitis [ ] central line          Drug Dosing Weight      Vital Signs Last 24 Hrs  T(F): 100 (20 @ 14:35), Max: 100 (20 @ 14:35)    Vital Signs Last 24 Hrs  HR: 80 (20 @ 14:35) (77 - 97)  BP: 173/117 (20 @ 14:35) (162/113 - 184/135)  RR: 18 (20 @ 14:35)  SpO2: 100% (20 @ 14:35) (89% - 100%)  Wt(kg): --                          7.5    3.93  )-----------( 152      ( 2020 06:00 )             23.4           133<L>  |  89<L>  |  118<H>  ----------------------------<  84  6.0<H>   |  15<L>  |  12.67<H>    Ca    9.0      2020 06:40  Phos  12.1       Mg     2.2         TPro  7.6  /  Alb  4.5  /  TBili  0.2  /  DBili  x   /  AST  19  /  ALT  22  /  AlkPhos  55        Urinalysis Basic - ( 2020 08:12 )    Color: LIGHT YELLOW / Appearance: CLEAR / S.013 / pH: 6.5  Gluc: NEGATIVE / Ketone: NEGATIVE  / Bili: NEGATIVE / Urobili: NORMAL   Blood: NEGATIVE / Protein: 300 / Nitrite: NEGATIVE   Leuk Esterase: NEGATIVE / RBC: 3-5 / WBC 3-5   Sq Epi: FEW / Non Sq Epi: x / Bacteria: NEGATIVE        MICROBIOLOGY:    RADIOLOGY: Images reviewed by me    < from: CT Chest No Cont (20 @ 23:14) >    EXAM:  CT CHEST        PROCEDURE DATE:  2020         INTERPRETATION:  CLINICAL INDICATION: Ammonia.    Axial CT images of the chest are obtained without intravenous administration of contrast.    Comparison is made with the prior chest CT ofJuly 2015.    No enlarged axillary lymph nodes nodes. Nonspecific 8 mm right upper paratracheal lymph node. Other smaller nonspecific mediastinal lymph nodes are also noted.    Cardiomegaly. No pericardial effusion. Main pulmonary artery is enlarged measuring about 4.3 cm suggestive of pulmonary arterial hypertension. Minimal bilateral pleural fluid.    Bilateral gynecomastia.    Evaluation of the upper abdomen demonstrate trace amount of ascites. Small hypodensities within the partially imaged kidneys.    Evaluation of the lungs demonstrate mild bibasilar areas of linear atelectasis.     Widespread small centrilobular nodular opacities throughout the right lower lobe representing pneumonia with endobronchial spread. A few clusters of foci of impacted distal airways within the left lower lobe may also be related to the infection.     Minimal interlobular septal thickening may be related to the cardiac dysfunction and fluid overload.    No central endobronchial lesions.    Evaluation of the bone demonstrate no significant abnormality.    IMPRESSION: Nodular opacities throughout the right lower lobe representing pneumonia with endobronchial spread. A 1 to 3 month follow-up chest CT is recommended to ensure resolution.    Cardiomegaly as described above.    < end of copied text >
Patient is a 34y old  Male who presents with a chief complaint of malaise, fatigue, abdominal bloating and Hemoglobin at outpatient dialysis of 7.3.     HPI:  Mr. Lemus is a 33 year old  gentleman with significant PMH of HTN, ESRD on home hemodialysis, secondary hyperparathyroidism who presented to [facility] ED with fatigue, malaise, abdominal bloating who was sent by his nephrologist, Dr Castro for blood transfusion. He was reported to have a hemoglobin of 7.3 from his outpatient dialysis unit and had SOB and fatigue and was advised to go the ED in the am. Patient stated that the last week he was exhausted and had gas pain and over the last two days he could not sleep well. He stated he just was not himself and had no energy. He was more fatigued and progressed leading to decreased activity. He had blood work done at [place] and it was reported that his hemoglobin came back at 7.3. His nephrologist, Dr. Castro (myself) called him last night and he stated he was tired and was thought he had symptomatic anemia and asked that he go to ED to get blood transfusion. He came this afternoon and was seen right away. He was Pan cultured and on CXR was found to have right lower lobe pneumonia.  He also had abdominal pain with gas pain, that believed that pain was due to gas and used Gas X with minimal relief. Additionally, the patient says he has a history of uncontrolled HTN which he takes amlodipine 10 mg po daily, clonidine 0.3 mg TID, Hydralazine 100mg TID, Labetalol 600 mg po TID, Lasix 80 mg po daily, Losartan 100 mg po QHS and sensipar 90 mg M,W,F, and Spironlactone 100 mg po BID. He claims he is compliant with these medication but SBP still uncontrolled will see in the hospital. His typical pressures are but says that they may range in the high 100's to low 200's systolic. Nephrology consulted for ESRD on Hemodialysis.     PAST MEDICAL & SURGICAL HISTORY:  HTN (hypertension)  ESRD on home hemodialysis.  Secondary hyperparathyroidism of renal origin.   AVF creation in his left Arm.   No significant past surgical history    MEDICATIONS HOME  Amlodipine 10 mg po daily  Aranesp 100 mcg weekly  Calcitriol 0.5 mcg daily   Clonidine 0.3 mg TID  Hydralazine 100mg TID  Labetalol 600 mg po TID  Lasix 80 mg po daily  Losartan 100 mg po QHS   Sensipar 90 mg M,W,F  Sevelamer 800 mg po TID with meals.   Spironlactone 100 mg po BID   Venofer 200 mg     MEDICATIONS  (STANDING):  azithromycin  IVPB 500 milliGRAM(s) IV Intermittent every 24 hours  cefTRIAXone   IVPB 1000 milliGRAM(s) IV Intermittent Once  cloNIDine 0.2 milliGRAM(s) Oral Once  hydrALAZINE 100 milliGRAM(s) Oral Once  labetalol 200 milliGRAM(s) Oral Once      Allergies  No Known Allergies    SOCIAL HISTORY:  Denies alcohol or tobacco abuse.    FAMILY HISTORY:  Family history of systemic lupus erythematosus (Aunt)  No kidney disease in family.    REVIEW OF SYSTEMS:  CONSTITUTIONAL: No weakness, fevers or chills  EYES/ENT: No visual changes  NECK: No pain or stiffness  RESPIRATORY: No cough, wheezing, hemoptysis. No shortness of breath  CARDIOVASCULAR: No chest pain or palpitations  GASTROINTESTINAL: No abdominal or epigastric pain. No nausea, vomiting, or hematemesis. No diarrhea or constipation. No melena or hematochezia  GENITOURINARY: No dysuria, frequency or hematuria. No stones or infection  NEUROLOGICAL: No numbness or weakness  SKIN: No itching, burning, rashes, or lesions   All other review of systems is negative unless indicated above    VITAL:  T(C): , Max: 37.6 (02-26-20 @ 12:24)  T(F): , Max: 99.7 (02-26-20 @ 12:24)  HR: 96 (02-26-20 @ 14:46)  BP: 197/127 (02-26-20 @ 14:46)  BP(mean): --  RR: 18 (02-26-20 @ 14:46)  SpO2: 95% (02-26-20 @ 14:46)  Wt(kg): --    PHYSICAL EXAM:  General: NAD, Alert, Pleasent  HEENT: NCAT, PERRLA  Neck: Supple, No JVD  Respiratory: CTA-b/l  Cardiovascular: RRR s1s2, no m/r/g  Gastrointestinal: +BS, soft, NT/ND  Extremities: No peripheral edema b/l  Neurological: no focal deficits; strength grossly intact  Psychiatric: Normal mood, normal affect  Back: no CVAT b/l  Skin: No rashes, no nevi  Access:    LABS:                        8.7    6.17  )-----------( 175      ( 26 Feb 2020 13:00 )             27.0     Na(138)/K(5.8)/Cl(92)/HCO3(17)/BUN(102)/Cr(11.76)Glu(100)/Ca(9.6)/Mg(--)/PO4(--)    02-26 @ 13:00 02-26    138  |  92<L>  |  102<H>  ----------------------------<  100<H>  5.8<H>   |  17<L>  |  11.76<H>    Ca    9.6      26 Feb 2020 13:00    TPro  8.7<H>  /  Alb  5.2<H>  /  TBili  0.3  /  DBili  x   /  AST  11  /  ALT  19  /  AlkPhos  62  02-26 02-26    138  |  92<L>  |  102<H>  ----------------------------<  100<H>  5.8<H>   |  17<L>  |  11.76<H>    Ca    9.6      26 Feb 2020 13:00    TPro  8.7<H>  /  Alb  5.2<H>  /  TBili  0.3  /  DBili  x   /  AST  11  /  ALT  19  /  AlkPhos  62  02-26    IMAGING:  EXAM:  XR CHEST PA LAT 2V        PROCEDURE DATE:  Feb 26 2020         INTERPRETATION:  Indication: Sepsis.    Technique: PA and lateral views of the chest.    Comparison: 8/1/2015    Findings: The cardiac silhouette is enlarged. Surgical clips overlie the cardiac silhouette. There are no pleural effusions. There is a patchy consolidation in the right lower lobe, likely representing pneumonia.    Impression: Cardiomegaly. Right lower lobe pneumonia.                  ATIYA CHAUDHARY M.D., ATTENDING RADIOLOGIST  This document has been electronically signed. Feb 26 2020  1:36PM
Chart reviewed, pt. seen/evaluated with Dr. Cox, I agree with above assessment/plan. Patient alert/oriented, guarded and superficially cooperative, visibly depressed/withdrawn, often dismissive and minimizing her sxs, denies current and past SI, denies HI, +tremors, + tongue fasciculation and nystagmus on exam, care coordinated with family (see above for details). Plan as above, will follow

## 2024-01-04 NOTE — PROGRESS NOTE ADULT - PROBLEM SELECTOR PLAN 3
Sheath was removed in the vein. - anemia W/U   - may give one unit PRBC during next transfusion, defer to renal